# Patient Record
Sex: FEMALE | Race: ASIAN | NOT HISPANIC OR LATINO
[De-identification: names, ages, dates, MRNs, and addresses within clinical notes are randomized per-mention and may not be internally consistent; named-entity substitution may affect disease eponyms.]

---

## 2017-03-13 PROBLEM — Z00.00 ENCOUNTER FOR PREVENTIVE HEALTH EXAMINATION: Status: ACTIVE | Noted: 2017-03-13

## 2017-03-14 ENCOUNTER — APPOINTMENT (OUTPATIENT)
Dept: OBGYN | Facility: CLINIC | Age: 27
End: 2017-03-14

## 2017-03-15 ENCOUNTER — EMERGENCY (EMERGENCY)
Facility: HOSPITAL | Age: 27
LOS: 1 days | Discharge: HOME | End: 2017-03-15
Attending: EMERGENCY MEDICINE | Admitting: EMERGENCY MEDICINE
Payer: MEDICAID

## 2017-03-15 VITALS
TEMPERATURE: 98 F | RESPIRATION RATE: 18 BRPM | HEART RATE: 94 BPM | DIASTOLIC BLOOD PRESSURE: 75 MMHG | OXYGEN SATURATION: 100 % | SYSTOLIC BLOOD PRESSURE: 111 MMHG

## 2017-03-15 VITALS
RESPIRATION RATE: 18 BRPM | OXYGEN SATURATION: 100 % | DIASTOLIC BLOOD PRESSURE: 74 MMHG | SYSTOLIC BLOOD PRESSURE: 111 MMHG | TEMPERATURE: 100 F | HEART RATE: 109 BPM

## 2017-03-15 LAB
ALBUMIN SERPL ELPH-MCNC: 4.4 G/DL — SIGNIFICANT CHANGE UP (ref 3.3–5)
ALP SERPL-CCNC: 55 U/L — SIGNIFICANT CHANGE UP (ref 40–120)
ALT FLD-CCNC: 11 U/L — SIGNIFICANT CHANGE UP (ref 4–33)
APPEARANCE UR: CLEAR — SIGNIFICANT CHANGE UP
AST SERPL-CCNC: 16 U/L — SIGNIFICANT CHANGE UP (ref 4–32)
BACTERIA # UR AUTO: SIGNIFICANT CHANGE UP
BASOPHILS # BLD AUTO: 0.01 K/UL — SIGNIFICANT CHANGE UP (ref 0–0.2)
BASOPHILS NFR BLD AUTO: 0.1 % — SIGNIFICANT CHANGE UP (ref 0–2)
BILIRUB SERPL-MCNC: 0.5 MG/DL — SIGNIFICANT CHANGE UP (ref 0.2–1.2)
BILIRUB UR-MCNC: NEGATIVE — SIGNIFICANT CHANGE UP
BLD GP AB SCN SERPL QL: NEGATIVE — SIGNIFICANT CHANGE UP
BLOOD UR QL VISUAL: HIGH
BUN SERPL-MCNC: 4 MG/DL — LOW (ref 7–23)
CALCIUM SERPL-MCNC: 9.3 MG/DL — SIGNIFICANT CHANGE UP (ref 8.4–10.5)
CHLORIDE SERPL-SCNC: 100 MMOL/L — SIGNIFICANT CHANGE UP (ref 98–107)
CO2 SERPL-SCNC: 21 MMOL/L — LOW (ref 22–31)
COLOR SPEC: SIGNIFICANT CHANGE UP
CREAT SERPL-MCNC: 0.6 MG/DL — SIGNIFICANT CHANGE UP (ref 0.5–1.3)
EOSINOPHIL # BLD AUTO: 0.13 K/UL — SIGNIFICANT CHANGE UP (ref 0–0.5)
EOSINOPHIL NFR BLD AUTO: 1.8 % — SIGNIFICANT CHANGE UP (ref 0–6)
GLUCOSE SERPL-MCNC: 94 MG/DL — SIGNIFICANT CHANGE UP (ref 70–99)
GLUCOSE UR-MCNC: NEGATIVE — SIGNIFICANT CHANGE UP
HCG SERPL-ACNC: SIGNIFICANT CHANGE UP MIU/ML
HCT VFR BLD CALC: 36.9 % — SIGNIFICANT CHANGE UP (ref 34.5–45)
HGB BLD-MCNC: 12.1 G/DL — SIGNIFICANT CHANGE UP (ref 11.5–15.5)
IMM GRANULOCYTES NFR BLD AUTO: 0.1 % — SIGNIFICANT CHANGE UP (ref 0–1.5)
KETONES UR-MCNC: SIGNIFICANT CHANGE UP
LEUKOCYTE ESTERASE UR-ACNC: HIGH
LYMPHOCYTES # BLD AUTO: 1.11 K/UL — SIGNIFICANT CHANGE UP (ref 1–3.3)
LYMPHOCYTES # BLD AUTO: 15.5 % — SIGNIFICANT CHANGE UP (ref 13–44)
MCHC RBC-ENTMCNC: 27 PG — SIGNIFICANT CHANGE UP (ref 27–34)
MCHC RBC-ENTMCNC: 32.8 % — SIGNIFICANT CHANGE UP (ref 32–36)
MCV RBC AUTO: 82.4 FL — SIGNIFICANT CHANGE UP (ref 80–100)
MONOCYTES # BLD AUTO: 0.66 K/UL — SIGNIFICANT CHANGE UP (ref 0–0.9)
MONOCYTES NFR BLD AUTO: 9.2 % — SIGNIFICANT CHANGE UP (ref 2–14)
MUCOUS THREADS # UR AUTO: SIGNIFICANT CHANGE UP
NEUTROPHILS # BLD AUTO: 5.23 K/UL — SIGNIFICANT CHANGE UP (ref 1.8–7.4)
NEUTROPHILS NFR BLD AUTO: 73.3 % — SIGNIFICANT CHANGE UP (ref 43–77)
NITRITE UR-MCNC: NEGATIVE — SIGNIFICANT CHANGE UP
PH UR: 6.5 — SIGNIFICANT CHANGE UP (ref 4.6–8)
PLATELET # BLD AUTO: 163 K/UL — SIGNIFICANT CHANGE UP (ref 150–400)
PMV BLD: 11.8 FL — SIGNIFICANT CHANGE UP (ref 7–13)
POTASSIUM SERPL-MCNC: 3.5 MMOL/L — SIGNIFICANT CHANGE UP (ref 3.5–5.3)
POTASSIUM SERPL-SCNC: 3.5 MMOL/L — SIGNIFICANT CHANGE UP (ref 3.5–5.3)
PROT SERPL-MCNC: 7.3 G/DL — SIGNIFICANT CHANGE UP (ref 6–8.3)
PROT UR-MCNC: NEGATIVE — SIGNIFICANT CHANGE UP
RBC # BLD: 4.48 M/UL — SIGNIFICANT CHANGE UP (ref 3.8–5.2)
RBC # FLD: 14.1 % — SIGNIFICANT CHANGE UP (ref 10.3–14.5)
RBC CASTS # UR COMP ASSIST: HIGH (ref 0–?)
RH IG SCN BLD-IMP: POSITIVE — SIGNIFICANT CHANGE UP
SODIUM SERPL-SCNC: 137 MMOL/L — SIGNIFICANT CHANGE UP (ref 135–145)
SP GR SPEC: 1.01 — SIGNIFICANT CHANGE UP (ref 1–1.03)
SQUAMOUS # UR AUTO: SIGNIFICANT CHANGE UP
UROBILINOGEN FLD QL: NORMAL E.U. — SIGNIFICANT CHANGE UP (ref 0.1–0.2)
WBC # BLD: 7.15 K/UL — SIGNIFICANT CHANGE UP (ref 3.8–10.5)
WBC # FLD AUTO: 7.15 K/UL — SIGNIFICANT CHANGE UP (ref 3.8–10.5)
WBC UR QL: SIGNIFICANT CHANGE UP (ref 0–?)

## 2017-03-15 PROCEDURE — 76801 OB US < 14 WKS SINGLE FETUS: CPT | Mod: 26

## 2017-03-15 PROCEDURE — 99284 EMERGENCY DEPT VISIT MOD MDM: CPT

## 2017-03-15 RX ORDER — CEPHALEXIN 500 MG
1 CAPSULE ORAL
Qty: 20 | Refills: 0 | OUTPATIENT
Start: 2017-03-15 | End: 2017-03-25

## 2017-03-15 RX ORDER — CEPHALEXIN 500 MG
500 CAPSULE ORAL EVERY 12 HOURS
Qty: 0 | Refills: 0 | Status: DISCONTINUED | OUTPATIENT
Start: 2017-03-15 | End: 2017-03-19

## 2017-03-15 RX ORDER — SODIUM CHLORIDE 9 MG/ML
2000 INJECTION INTRAMUSCULAR; INTRAVENOUS; SUBCUTANEOUS ONCE
Qty: 0 | Refills: 0 | Status: COMPLETED | OUTPATIENT
Start: 2017-03-15 | End: 2017-03-15

## 2017-03-15 RX ADMIN — SODIUM CHLORIDE 1000 MILLILITER(S): 9 INJECTION INTRAMUSCULAR; INTRAVENOUS; SUBCUTANEOUS at 20:53

## 2017-03-15 RX ADMIN — Medication 500 MILLIGRAM(S): at 22:47

## 2017-03-15 NOTE — ED ADULT TRIAGE NOTE - CHIEF COMPLAINT QUOTE
states" I am having back pain since 2 days , nausea fever with head ache, patient is about 10 weeks pregnant and did not see any OB/GYN did not have a sonogram done yet. denies cp, dizziness.

## 2017-03-15 NOTE — ED ADULT NURSE NOTE - OBJECTIVE STATEMENT
received pt to intake room 3 for evaluation of general malaise, fevers and malaise since last night. pt reports being 10 weeks pregnant. denies any pregnancy related complaints. pt presents awake a&ox4, denies dizziness. skin warm, dry, intact, appropriate for race. respirations even unlabored. lungs cta. denies cp or sob. abdomen soft nontender nondistended bs+x4 denies n/v/d. ivl placed bloods drawn and sent. ns0.9% bolus infusing. family at bedside.

## 2017-03-15 NOTE — ED PROVIDER NOTE - MEDICAL DECISION MAKING DETAILS
26 YOF G1T0 10 weeks pregnant LMP first week of Jan 17 p/w muscle aches, fever, and headache. Sx began early yesterday. Tachycardia.  Evaluate metabolic and hematologic function given no formal OB care.  Treat symptomatically, resuscitate, reassess.

## 2017-03-15 NOTE — ED PROVIDER NOTE - OBJECTIVE STATEMENT
26 YOF G1T0 10 weeks pregnant LMP first week of Jan 17 p/w muscle aches, fever, and headache. Sx began early yesterday.  Associated malaise. Pt could not sleep last night and decided to come to the ED.  No urinary urgency, urinary retention, urinary frequency, hematuria, dysuria, dark urine, flank pain, groin pain, or urethral pain. No cough, no chest pain, no SOB.

## 2017-03-15 NOTE — ED ADULT NURSE REASSESSMENT NOTE - NS ED NURSE REASSESS COMMENT FT1
Patient discharged by MD. No signs of distress at this time. Discharge instructions were provided. Imelda GREGORY

## 2017-03-15 NOTE — ED PROVIDER NOTE - PROGRESS NOTE DETAILS
JW Symptoms improved. US unremarkable. Pt instructed to continue taking Tylenol.  TAUS reveals normal IUP consistent with dates.  Pt suffers from viral syndrome.  I reviewed the alarm symptoms of this patient's diagnosis and discussed criteria for their return to the emergency department.  I instructed the patient to return to the emergency department with any alarm symptoms for their specific diagnosis including headache, fever, confusion, vaginal bleeding, abdominal pain, any worsening symptoms, and any other concerns.  I instructed this patient to call their primary doctor today, to inform them of their visit to the emergency department, and to obtain a repeat evaluation in the next 24 hours.  This patient understood and agreed with our plan for follow up and felt safe returning home.  At the time of discharge this patient remained in stable condition, in no acute distress, with stable vital signs. JW Symptoms improved. US unremarkable.  Given bacteruria pt given and prescribed Keflex. Pt instructed to continue taking Tylenol.  TAUS reveals normal IUP consistent with dates.  Pt suffers from viral syndrome.  I reviewed the alarm symptoms of this patient's diagnosis and discussed criteria for their return to the emergency department.  I instructed the patient to return to the emergency department with any alarm symptoms for their specific diagnosis including headache, fever, confusion, vaginal bleeding, abdominal pain, any worsening symptoms, and any other concerns.  I instructed this patient to call their primary doctor today, to inform them of their visit to the emergency department, and to obtain a repeat evaluation in the next 24 hours.  This patient understood and agreed with our plan for follow up and felt safe returning home.  At the time of discharge this patient remained in stable condition, in no acute distress, with stable vital signs.

## 2017-03-15 NOTE — ED PROVIDER NOTE - ATTENDING CONTRIBUTION TO CARE
DR. SMALL, ATTENDING MD-  I performed a face to face bedside interview with patient regarding history of present illness, review of symptoms and past medical history. I completed an independent physical exam.  I have discussed patient's plan of care with the resident.   Documentation as above in the note.    27 y/o female 10 wk preg here with c/o muscle aches in back, ha, nausea, sneezing, runny nose.  Denies f/c, neck stiffness, cp, sob, cough, abd pain, n/v/d, dysuria, vag bleed or d/c, rash.  Afebrile, vs wnl, nad, neck supple, ctabil, s1s2 rrr no m/r/g, abd soft non ttp no r/g, no cva tenderness b/l, no leg swelling b/l, no rash.  Likely viral syndrome.  Obtain cbc, bmp, ua, give ivf, tylenol, reassess.

## 2017-03-21 ENCOUNTER — APPOINTMENT (OUTPATIENT)
Dept: OBGYN | Facility: CLINIC | Age: 27
End: 2017-03-21

## 2017-05-04 PROBLEM — Z00.00 ENCOUNTER FOR PREVENTIVE HEALTH EXAMINATION: Noted: 2017-05-04

## 2017-05-17 ENCOUNTER — APPOINTMENT (OUTPATIENT)
Dept: OBGYN | Facility: CLINIC | Age: 27
End: 2017-05-17

## 2017-06-01 ENCOUNTER — ASOB RESULT (OUTPATIENT)
Age: 27
End: 2017-06-01

## 2017-06-01 ENCOUNTER — APPOINTMENT (OUTPATIENT)
Dept: ANTEPARTUM | Facility: CLINIC | Age: 27
End: 2017-06-01

## 2017-09-18 ENCOUNTER — OUTPATIENT (OUTPATIENT)
Dept: OUTPATIENT SERVICES | Facility: HOSPITAL | Age: 27
LOS: 1 days | End: 2017-09-18

## 2017-09-18 ENCOUNTER — APPOINTMENT (OUTPATIENT)
Dept: ANTEPARTUM | Facility: HOSPITAL | Age: 27
End: 2017-09-18
Payer: MEDICAID

## 2017-09-18 ENCOUNTER — APPOINTMENT (OUTPATIENT)
Dept: ANTEPARTUM | Facility: CLINIC | Age: 27
End: 2017-09-18
Payer: MEDICAID

## 2017-09-18 ENCOUNTER — ASOB RESULT (OUTPATIENT)
Age: 27
End: 2017-09-18

## 2017-09-18 PROCEDURE — 76816 OB US FOLLOW-UP PER FETUS: CPT

## 2017-09-18 PROCEDURE — 76818 FETAL BIOPHYS PROFILE W/NST: CPT | Mod: 26,59

## 2017-09-18 PROCEDURE — 59412 ANTEPARTUM MANIPULATION: CPT

## 2017-09-18 PROCEDURE — 59025 FETAL NON-STRESS TEST: CPT | Mod: 26

## 2017-09-19 ENCOUNTER — APPOINTMENT (OUTPATIENT)
Dept: ANTEPARTUM | Facility: HOSPITAL | Age: 27
End: 2017-09-19
Payer: MEDICAID

## 2017-09-19 ENCOUNTER — ASOB RESULT (OUTPATIENT)
Age: 27
End: 2017-09-19

## 2017-09-19 ENCOUNTER — OUTPATIENT (OUTPATIENT)
Dept: OUTPATIENT SERVICES | Facility: HOSPITAL | Age: 27
LOS: 1 days | End: 2017-09-19

## 2017-09-19 PROCEDURE — 59025 FETAL NON-STRESS TEST: CPT | Mod: 26

## 2017-09-29 DIAGNOSIS — Z3A.37 37 WEEKS GESTATION OF PREGNANCY: ICD-10-CM

## 2017-10-04 ENCOUNTER — OUTPATIENT (OUTPATIENT)
Dept: OUTPATIENT SERVICES | Facility: HOSPITAL | Age: 27
LOS: 1 days | End: 2017-10-04

## 2017-10-04 DIAGNOSIS — O26.899 OTHER SPECIFIED PREGNANCY RELATED CONDITIONS, UNSPECIFIED TRIMESTER: ICD-10-CM

## 2017-10-04 DIAGNOSIS — Z3A.00 WEEKS OF GESTATION OF PREGNANCY NOT SPECIFIED: ICD-10-CM

## 2017-10-06 ENCOUNTER — INPATIENT (INPATIENT)
Facility: HOSPITAL | Age: 27
LOS: 2 days | Discharge: HOME | End: 2017-10-09
Attending: OBSTETRICS & GYNECOLOGY | Admitting: OBSTETRICS & GYNECOLOGY

## 2017-10-06 ENCOUNTER — OUTPATIENT (OUTPATIENT)
Dept: OUTPATIENT SERVICES | Facility: HOSPITAL | Age: 27
LOS: 1 days | End: 2017-10-06

## 2017-10-06 VITALS — WEIGHT: 158.73 LBS | HEIGHT: 62 IN

## 2017-10-06 DIAGNOSIS — O26.899 OTHER SPECIFIED PREGNANCY RELATED CONDITIONS, UNSPECIFIED TRIMESTER: ICD-10-CM

## 2017-10-06 DIAGNOSIS — Z3A.00 WEEKS OF GESTATION OF PREGNANCY NOT SPECIFIED: ICD-10-CM

## 2017-10-06 LAB
BASOPHILS # BLD AUTO: 0.03 K/UL — SIGNIFICANT CHANGE UP (ref 0–0.2)
BASOPHILS NFR BLD AUTO: 0.2 % — SIGNIFICANT CHANGE UP (ref 0–2)
BLD GP AB SCN SERPL QL: NEGATIVE — SIGNIFICANT CHANGE UP
EOSINOPHIL # BLD AUTO: 0.05 K/UL — SIGNIFICANT CHANGE UP (ref 0–0.5)
EOSINOPHIL NFR BLD AUTO: 0.3 % — SIGNIFICANT CHANGE UP (ref 0–6)
HCT VFR BLD CALC: 40.4 % — SIGNIFICANT CHANGE UP (ref 34.5–45)
HGB BLD-MCNC: 13.2 G/DL — SIGNIFICANT CHANGE UP (ref 11.5–15.5)
IMM GRANULOCYTES # BLD AUTO: 0.17 # — SIGNIFICANT CHANGE UP
IMM GRANULOCYTES NFR BLD AUTO: 1.2 % — SIGNIFICANT CHANGE UP (ref 0–1.5)
LYMPHOCYTES # BLD AUTO: 1.98 K/UL — SIGNIFICANT CHANGE UP (ref 1–3.3)
LYMPHOCYTES # BLD AUTO: 13.6 % — SIGNIFICANT CHANGE UP (ref 13–44)
MCHC RBC-ENTMCNC: 30 PG — SIGNIFICANT CHANGE UP (ref 27–34)
MCHC RBC-ENTMCNC: 32.7 % — SIGNIFICANT CHANGE UP (ref 32–36)
MCV RBC AUTO: 91.8 FL — SIGNIFICANT CHANGE UP (ref 80–100)
MONOCYTES # BLD AUTO: 0.82 K/UL — SIGNIFICANT CHANGE UP (ref 0–0.9)
MONOCYTES NFR BLD AUTO: 5.6 % — SIGNIFICANT CHANGE UP (ref 2–14)
NEUTROPHILS # BLD AUTO: 11.53 K/UL — HIGH (ref 1.8–7.4)
NEUTROPHILS NFR BLD AUTO: 79.1 % — HIGH (ref 43–77)
NRBC # FLD: 0 — SIGNIFICANT CHANGE UP
PLATELET # BLD AUTO: 136 K/UL — LOW (ref 150–400)
PMV BLD: 12.4 FL — SIGNIFICANT CHANGE UP (ref 7–13)
RBC # BLD: 4.4 M/UL — SIGNIFICANT CHANGE UP (ref 3.8–5.2)
RBC # FLD: 14.2 % — SIGNIFICANT CHANGE UP (ref 10.3–14.5)
RH IG SCN BLD-IMP: POSITIVE — SIGNIFICANT CHANGE UP
WBC # BLD: 14.58 K/UL — HIGH (ref 3.8–10.5)
WBC # FLD AUTO: 14.58 K/UL — HIGH (ref 3.8–10.5)

## 2017-10-06 RX ORDER — SODIUM CHLORIDE 9 MG/ML
1000 INJECTION, SOLUTION INTRAVENOUS ONCE
Qty: 0 | Refills: 0 | Status: COMPLETED | OUTPATIENT
Start: 2017-10-06 | End: 2017-10-06

## 2017-10-06 RX ORDER — OXYTOCIN 10 UNIT/ML
333.33 VIAL (ML) INJECTION
Qty: 20 | Refills: 0 | Status: DISCONTINUED | OUTPATIENT
Start: 2017-10-06 | End: 2017-10-07

## 2017-10-06 RX ORDER — SODIUM CHLORIDE 9 MG/ML
1000 INJECTION, SOLUTION INTRAVENOUS ONCE
Qty: 0 | Refills: 0 | Status: DISCONTINUED | OUTPATIENT
Start: 2017-10-06 | End: 2017-10-07

## 2017-10-06 RX ORDER — BUTORPHANOL TARTRATE 2 MG/ML
2 INJECTION, SOLUTION INTRAMUSCULAR; INTRAVENOUS ONCE
Qty: 0 | Refills: 0 | Status: DISCONTINUED | OUTPATIENT
Start: 2017-10-06 | End: 2017-10-06

## 2017-10-06 RX ORDER — SODIUM CHLORIDE 9 MG/ML
1000 INJECTION, SOLUTION INTRAVENOUS
Qty: 0 | Refills: 0 | Status: DISCONTINUED | OUTPATIENT
Start: 2017-10-06 | End: 2017-10-07

## 2017-10-06 RX ORDER — CITRIC ACID/SODIUM CITRATE 300-500 MG
15 SOLUTION, ORAL ORAL EVERY 4 HOURS
Qty: 0 | Refills: 0 | Status: DISCONTINUED | OUTPATIENT
Start: 2017-10-06 | End: 2017-10-07

## 2017-10-06 RX ORDER — OXYTOCIN 10 UNIT/ML
333.33 VIAL (ML) INJECTION
Qty: 20 | Refills: 0 | Status: COMPLETED | OUTPATIENT
Start: 2017-10-06

## 2017-10-06 RX ORDER — INFLUENZA VIRUS VACCINE 15; 15; 15; 15 UG/.5ML; UG/.5ML; UG/.5ML; UG/.5ML
0.5 SUSPENSION INTRAMUSCULAR ONCE
Qty: 0 | Refills: 0 | Status: DISCONTINUED | OUTPATIENT
Start: 2017-10-06 | End: 2017-10-09

## 2017-10-06 RX ORDER — CITRIC ACID/SODIUM CITRATE 300-500 MG
15 SOLUTION, ORAL ORAL EVERY 4 HOURS
Qty: 0 | Refills: 0 | Status: DISCONTINUED | OUTPATIENT
Start: 2017-10-06 | End: 2017-10-06

## 2017-10-06 RX ADMIN — SODIUM CHLORIDE 2000 MILLILITER(S): 9 INJECTION, SOLUTION INTRAVENOUS at 21:55

## 2017-10-06 RX ADMIN — BUTORPHANOL TARTRATE 2 MILLIGRAM(S): 2 INJECTION, SOLUTION INTRAMUSCULAR; INTRAVENOUS at 19:35

## 2017-10-06 RX ADMIN — SODIUM CHLORIDE 150 MILLILITER(S): 9 INJECTION, SOLUTION INTRAVENOUS at 18:11

## 2017-10-06 RX ADMIN — Medication 204 MILLIGRAM(S): at 18:18

## 2017-10-06 RX ADMIN — SODIUM CHLORIDE 125 MILLILITER(S): 9 INJECTION, SOLUTION INTRAVENOUS at 19:36

## 2017-10-06 RX ADMIN — BUTORPHANOL TARTRATE 2 MILLIGRAM(S): 2 INJECTION, SOLUTION INTRAMUSCULAR; INTRAVENOUS at 18:08

## 2017-10-07 ENCOUNTER — TRANSCRIPTION ENCOUNTER (OUTPATIENT)
Age: 27
End: 2017-10-07

## 2017-10-07 LAB — T PALLIDUM AB TITR SER: NEGATIVE — SIGNIFICANT CHANGE UP

## 2017-10-07 RX ORDER — AER TRAVELER 0.5 G/1
1 SOLUTION RECTAL; TOPICAL EVERY 4 HOURS
Qty: 0 | Refills: 0 | Status: DISCONTINUED | OUTPATIENT
Start: 2017-10-07 | End: 2017-10-09

## 2017-10-07 RX ORDER — GENTAMICIN SULFATE 40 MG/ML
80 VIAL (ML) INJECTION ONCE
Qty: 0 | Refills: 0 | Status: COMPLETED | OUTPATIENT
Start: 2017-10-07 | End: 2017-10-07

## 2017-10-07 RX ORDER — OXYTOCIN 10 UNIT/ML
10 VIAL (ML) INJECTION ONCE
Qty: 0 | Refills: 0 | Status: COMPLETED | OUTPATIENT
Start: 2017-10-07 | End: 2017-10-07

## 2017-10-07 RX ORDER — GLYCERIN ADULT
1 SUPPOSITORY, RECTAL RECTAL AT BEDTIME
Qty: 0 | Refills: 0 | Status: DISCONTINUED | OUTPATIENT
Start: 2017-10-07 | End: 2017-10-09

## 2017-10-07 RX ORDER — ACETAMINOPHEN 500 MG
975 TABLET ORAL EVERY 6 HOURS
Qty: 0 | Refills: 0 | Status: DISCONTINUED | OUTPATIENT
Start: 2017-10-07 | End: 2017-10-08

## 2017-10-07 RX ORDER — ACETAMINOPHEN 500 MG
975 TABLET ORAL EVERY 6 HOURS
Qty: 0 | Refills: 0 | Status: DISCONTINUED | OUTPATIENT
Start: 2017-10-07 | End: 2017-10-09

## 2017-10-07 RX ORDER — SODIUM CHLORIDE 9 MG/ML
1000 INJECTION, SOLUTION INTRAVENOUS ONCE
Qty: 0 | Refills: 0 | Status: DISCONTINUED | OUTPATIENT
Start: 2017-10-07 | End: 2017-10-07

## 2017-10-07 RX ORDER — PRAMOXINE HYDROCHLORIDE 150 MG/15G
1 AEROSOL, FOAM RECTAL EVERY 4 HOURS
Qty: 0 | Refills: 0 | Status: DISCONTINUED | OUTPATIENT
Start: 2017-10-07 | End: 2017-10-08

## 2017-10-07 RX ORDER — OXYTOCIN 10 UNIT/ML
2 VIAL (ML) INJECTION
Qty: 30 | Refills: 0 | Status: DISCONTINUED | OUTPATIENT
Start: 2017-10-07 | End: 2017-10-08

## 2017-10-07 RX ORDER — GENTAMICIN SULFATE 40 MG/ML
VIAL (ML) INJECTION
Qty: 0 | Refills: 0 | Status: DISCONTINUED | OUTPATIENT
Start: 2017-10-07 | End: 2017-10-07

## 2017-10-07 RX ORDER — MAGNESIUM HYDROXIDE 400 MG/1
30 TABLET, CHEWABLE ORAL
Qty: 0 | Refills: 0 | Status: DISCONTINUED | OUTPATIENT
Start: 2017-10-07 | End: 2017-10-09

## 2017-10-07 RX ORDER — DOCUSATE SODIUM 100 MG
100 CAPSULE ORAL
Qty: 0 | Refills: 0 | Status: DISCONTINUED | OUTPATIENT
Start: 2017-10-07 | End: 2017-10-09

## 2017-10-07 RX ORDER — AMPICILLIN TRIHYDRATE 250 MG
2 CAPSULE ORAL ONCE
Qty: 0 | Refills: 0 | Status: COMPLETED | OUTPATIENT
Start: 2017-10-07 | End: 2017-10-07

## 2017-10-07 RX ORDER — AMPICILLIN TRIHYDRATE 250 MG
2 CAPSULE ORAL EVERY 6 HOURS
Qty: 0 | Refills: 0 | Status: DISCONTINUED | OUTPATIENT
Start: 2017-10-07 | End: 2017-10-07

## 2017-10-07 RX ORDER — HYDROCORTISONE 1 %
1 OINTMENT (GRAM) TOPICAL EVERY 4 HOURS
Qty: 0 | Refills: 0 | Status: DISCONTINUED | OUTPATIENT
Start: 2017-10-07 | End: 2017-10-08

## 2017-10-07 RX ORDER — GENTAMICIN SULFATE 40 MG/ML
80 VIAL (ML) INJECTION EVERY 8 HOURS
Qty: 0 | Refills: 0 | Status: DISCONTINUED | OUTPATIENT
Start: 2017-10-07 | End: 2017-10-07

## 2017-10-07 RX ORDER — OXYTOCIN 10 UNIT/ML
333.33 VIAL (ML) INJECTION
Qty: 20 | Refills: 0 | Status: DISCONTINUED | OUTPATIENT
Start: 2017-10-07 | End: 2017-10-07

## 2017-10-07 RX ORDER — IBUPROFEN 200 MG
600 TABLET ORAL EVERY 6 HOURS
Qty: 0 | Refills: 0 | Status: DISCONTINUED | OUTPATIENT
Start: 2017-10-07 | End: 2017-10-09

## 2017-10-07 RX ORDER — LANOLIN
1 OINTMENT (GRAM) TOPICAL EVERY 6 HOURS
Qty: 0 | Refills: 0 | Status: DISCONTINUED | OUTPATIENT
Start: 2017-10-07 | End: 2017-10-09

## 2017-10-07 RX ORDER — DIPHENHYDRAMINE HCL 50 MG
25 CAPSULE ORAL EVERY 6 HOURS
Qty: 0 | Refills: 0 | Status: DISCONTINUED | OUTPATIENT
Start: 2017-10-07 | End: 2017-10-09

## 2017-10-07 RX ORDER — CITRIC ACID/SODIUM CITRATE 300-500 MG
15 SOLUTION, ORAL ORAL EVERY 4 HOURS
Qty: 0 | Refills: 0 | Status: DISCONTINUED | OUTPATIENT
Start: 2017-10-07 | End: 2017-10-07

## 2017-10-07 RX ORDER — IBUPROFEN 200 MG
600 TABLET ORAL EVERY 6 HOURS
Qty: 0 | Refills: 0 | Status: COMPLETED | OUTPATIENT
Start: 2017-10-07 | End: 2018-09-05

## 2017-10-07 RX ORDER — DIBUCAINE 1 %
1 OINTMENT (GRAM) RECTAL EVERY 4 HOURS
Qty: 0 | Refills: 0 | Status: DISCONTINUED | OUTPATIENT
Start: 2017-10-07 | End: 2017-10-09

## 2017-10-07 RX ORDER — ACETAMINOPHEN 500 MG
975 TABLET ORAL EVERY 6 HOURS
Qty: 0 | Refills: 0 | Status: COMPLETED | OUTPATIENT
Start: 2017-10-07 | End: 2018-09-05

## 2017-10-07 RX ORDER — IBUPROFEN 200 MG
600 TABLET ORAL EVERY 6 HOURS
Qty: 0 | Refills: 0 | Status: DISCONTINUED | OUTPATIENT
Start: 2017-10-07 | End: 2017-10-08

## 2017-10-07 RX ORDER — OXYTOCIN 10 UNIT/ML
333.33 VIAL (ML) INJECTION
Qty: 20 | Refills: 0 | Status: DISCONTINUED | OUTPATIENT
Start: 2017-10-07 | End: 2017-10-08

## 2017-10-07 RX ORDER — TETANUS TOXOID, REDUCED DIPHTHERIA TOXOID AND ACELLULAR PERTUSSIS VACCINE, ADSORBED 5; 2.5; 8; 8; 2.5 [IU]/.5ML; [IU]/.5ML; UG/.5ML; UG/.5ML; UG/.5ML
0.5 SUSPENSION INTRAMUSCULAR ONCE
Qty: 0 | Refills: 0 | Status: COMPLETED | OUTPATIENT
Start: 2017-10-07 | End: 2018-09-05

## 2017-10-07 RX ORDER — SIMETHICONE 80 MG/1
80 TABLET, CHEWABLE ORAL EVERY 6 HOURS
Qty: 0 | Refills: 0 | Status: DISCONTINUED | OUTPATIENT
Start: 2017-10-07 | End: 2017-10-09

## 2017-10-07 RX ORDER — OXYCODONE HYDROCHLORIDE 5 MG/1
5 TABLET ORAL EVERY 4 HOURS
Qty: 0 | Refills: 0 | Status: DISCONTINUED | OUTPATIENT
Start: 2017-10-07 | End: 2017-10-09

## 2017-10-07 RX ORDER — KETOROLAC TROMETHAMINE 30 MG/ML
30 SYRINGE (ML) INJECTION ONCE
Qty: 0 | Refills: 0 | Status: DISCONTINUED | OUTPATIENT
Start: 2017-10-07 | End: 2017-10-07

## 2017-10-07 RX ORDER — SODIUM CHLORIDE 9 MG/ML
3 INJECTION INTRAMUSCULAR; INTRAVENOUS; SUBCUTANEOUS EVERY 8 HOURS
Qty: 0 | Refills: 0 | Status: DISCONTINUED | OUTPATIENT
Start: 2017-10-07 | End: 2017-10-08

## 2017-10-07 RX ORDER — AMPICILLIN TRIHYDRATE 250 MG
CAPSULE ORAL
Qty: 0 | Refills: 0 | Status: DISCONTINUED | OUTPATIENT
Start: 2017-10-07 | End: 2017-10-07

## 2017-10-07 RX ORDER — OXYCODONE HYDROCHLORIDE 5 MG/1
5 TABLET ORAL
Qty: 0 | Refills: 0 | Status: DISCONTINUED | OUTPATIENT
Start: 2017-10-07 | End: 2017-10-09

## 2017-10-07 RX ADMIN — Medication 975 MILLIGRAM(S): at 19:40

## 2017-10-07 RX ADMIN — Medication 0.25 MILLIGRAM(S): at 02:30

## 2017-10-07 RX ADMIN — Medication 0.25 MILLIGRAM(S): at 02:29

## 2017-10-07 RX ADMIN — SODIUM CHLORIDE 150 MILLILITER(S): 9 INJECTION, SOLUTION INTRAVENOUS at 13:43

## 2017-10-07 RX ADMIN — Medication 975 MILLIGRAM(S): at 13:07

## 2017-10-07 RX ADMIN — Medication 1 TABLET(S): at 18:43

## 2017-10-07 RX ADMIN — Medication 2 MILLIUNIT(S)/MIN: at 02:23

## 2017-10-07 RX ADMIN — Medication 600 MILLIGRAM(S): at 19:40

## 2017-10-07 RX ADMIN — Medication 600 MILLIGRAM(S): at 18:44

## 2017-10-07 RX ADMIN — OXYCODONE HYDROCHLORIDE 5 MILLIGRAM(S): 5 TABLET ORAL at 15:20

## 2017-10-07 RX ADMIN — OXYCODONE HYDROCHLORIDE 5 MILLIGRAM(S): 5 TABLET ORAL at 16:13

## 2017-10-07 RX ADMIN — Medication 216 GRAM(S): at 13:11

## 2017-10-07 RX ADMIN — SODIUM CHLORIDE 3 MILLILITER(S): 9 INJECTION INTRAMUSCULAR; INTRAVENOUS; SUBCUTANEOUS at 22:15

## 2017-10-07 RX ADMIN — Medication 200 MILLIGRAM(S): at 13:09

## 2017-10-07 RX ADMIN — SODIUM CHLORIDE 150 MILLILITER(S): 9 INJECTION, SOLUTION INTRAVENOUS at 07:39

## 2017-10-07 RX ADMIN — Medication 10 UNIT(S): at 13:51

## 2017-10-07 RX ADMIN — Medication 975 MILLIGRAM(S): at 18:43

## 2017-10-07 NOTE — DISCHARGE NOTE OB - MEDICATION SUMMARY - MEDICATIONS TO STOP TAKING
I will STOP taking the medications listed below when I get home from the hospital:    cephalexin 500 mg oral capsule  -- 1 cap(s) by mouth 2 times a day  -- Finish all this medication unless otherwise directed by prescriber.

## 2017-10-07 NOTE — DISCHARGE NOTE OB - MEDICATION SUMMARY - MEDICATIONS TO TAKE
I will START or STAY ON the medications listed below when I get home from the hospital:    acetaminophen 325 mg oral tablet  -- 3 tab(s) by mouth every 6 hours  -- Indication: For pain    ibuprofen 600 mg oral tablet  -- 1 tab(s) by mouth every 6 hours  -- Indication: For pain    docusate sodium 100 mg oral capsule  -- 1 cap(s) by mouth 2 times a day, As needed, Stool Softening  -- Indication: For stool softner

## 2017-10-07 NOTE — DISCHARGE NOTE OB - FINDINGS/TREATMENT
delivery of live born male infant    maternal fever treated intra partum with antibiotice   baby went to the NICU

## 2017-10-07 NOTE — DISCHARGE NOTE OB - CARE PLAN
Principal Discharge DX:	Normal delivery at term  Goal:	self care  Instructions for follow-up, activity and diet:	healthy dier  Secondary Diagnosis:	Maternal fever during labor  Goal:	afebrile  Instructions for follow-up, activity and diet:	antibiotics in the hospital until no temperature for 24 hours

## 2017-10-08 RX ORDER — PRAMOXINE HYDROCHLORIDE 150 MG/15G
1 AEROSOL, FOAM RECTAL EVERY 4 HOURS
Qty: 0 | Refills: 0 | Status: DISCONTINUED | OUTPATIENT
Start: 2017-10-08 | End: 2017-10-09

## 2017-10-08 RX ORDER — IBUPROFEN 200 MG
1 TABLET ORAL
Qty: 0 | Refills: 0 | DISCHARGE
Start: 2017-10-08

## 2017-10-08 RX ORDER — ACETAMINOPHEN 500 MG
3 TABLET ORAL
Qty: 0 | Refills: 0 | DISCHARGE
Start: 2017-10-08

## 2017-10-08 RX ORDER — DOCUSATE SODIUM 100 MG
1 CAPSULE ORAL
Qty: 0 | Refills: 0 | DISCHARGE
Start: 2017-10-08

## 2017-10-08 RX ORDER — TETANUS TOXOID, REDUCED DIPHTHERIA TOXOID AND ACELLULAR PERTUSSIS VACCINE, ADSORBED 5; 2.5; 8; 8; 2.5 [IU]/.5ML; [IU]/.5ML; UG/.5ML; UG/.5ML; UG/.5ML
0.5 SUSPENSION INTRAMUSCULAR ONCE
Qty: 0 | Refills: 0 | Status: COMPLETED | OUTPATIENT
Start: 2017-10-08 | End: 2017-10-08

## 2017-10-08 RX ORDER — HYDROCORTISONE 1 %
1 OINTMENT (GRAM) TOPICAL EVERY 4 HOURS
Qty: 0 | Refills: 0 | Status: DISCONTINUED | OUTPATIENT
Start: 2017-10-08 | End: 2017-10-09

## 2017-10-08 RX ADMIN — Medication 600 MILLIGRAM(S): at 20:43

## 2017-10-08 RX ADMIN — TETANUS TOXOID, REDUCED DIPHTHERIA TOXOID AND ACELLULAR PERTUSSIS VACCINE, ADSORBED 0.5 MILLILITER(S): 5; 2.5; 8; 8; 2.5 SUSPENSION INTRAMUSCULAR at 14:47

## 2017-10-08 RX ADMIN — Medication 600 MILLIGRAM(S): at 21:13

## 2017-10-08 RX ADMIN — Medication 975 MILLIGRAM(S): at 20:43

## 2017-10-08 RX ADMIN — Medication 600 MILLIGRAM(S): at 14:42

## 2017-10-08 RX ADMIN — Medication 975 MILLIGRAM(S): at 01:11

## 2017-10-08 RX ADMIN — Medication 100 MILLIGRAM(S): at 14:49

## 2017-10-08 RX ADMIN — Medication 975 MILLIGRAM(S): at 00:41

## 2017-10-08 RX ADMIN — Medication 975 MILLIGRAM(S): at 21:13

## 2017-10-08 RX ADMIN — Medication 600 MILLIGRAM(S): at 01:11

## 2017-10-08 RX ADMIN — Medication 600 MILLIGRAM(S): at 07:10

## 2017-10-08 RX ADMIN — Medication 975 MILLIGRAM(S): at 14:42

## 2017-10-08 RX ADMIN — Medication 600 MILLIGRAM(S): at 06:40

## 2017-10-08 RX ADMIN — Medication 1 TABLET(S): at 14:42

## 2017-10-08 RX ADMIN — Medication 600 MILLIGRAM(S): at 00:41

## 2017-10-09 VITALS
OXYGEN SATURATION: 100 % | DIASTOLIC BLOOD PRESSURE: 81 MMHG | HEART RATE: 69 BPM | TEMPERATURE: 99 F | RESPIRATION RATE: 18 BRPM | SYSTOLIC BLOOD PRESSURE: 125 MMHG

## 2017-10-09 LAB
BASOPHILS # BLD AUTO: 0.02 K/UL — SIGNIFICANT CHANGE UP (ref 0–0.2)
BASOPHILS # BLD AUTO: 0.04 K/UL — SIGNIFICANT CHANGE UP (ref 0–0.2)
BASOPHILS NFR BLD AUTO: 0.2 % — SIGNIFICANT CHANGE UP (ref 0–2)
BASOPHILS NFR BLD AUTO: 0.3 % — SIGNIFICANT CHANGE UP (ref 0–2)
BASOPHILS NFR SPEC: 0 % — SIGNIFICANT CHANGE UP (ref 0–2)
EOSINOPHIL # BLD AUTO: 0.16 K/UL — SIGNIFICANT CHANGE UP (ref 0–0.5)
EOSINOPHIL # BLD AUTO: 0.18 K/UL — SIGNIFICANT CHANGE UP (ref 0–0.5)
EOSINOPHIL NFR BLD AUTO: 1.5 % — SIGNIFICANT CHANGE UP (ref 0–6)
EOSINOPHIL NFR BLD AUTO: 1.5 % — SIGNIFICANT CHANGE UP (ref 0–6)
EOSINOPHIL NFR FLD: 1 % — SIGNIFICANT CHANGE UP (ref 0–6)
HCT VFR BLD CALC: 26.8 % — LOW (ref 34.5–45)
HCT VFR BLD CALC: 28.1 % — LOW (ref 34.5–45)
HGB BLD-MCNC: 8.9 G/DL — LOW (ref 11.5–15.5)
HGB BLD-MCNC: 9.2 G/DL — LOW (ref 11.5–15.5)
IMM GRANULOCYTES # BLD AUTO: 0.26 # — SIGNIFICANT CHANGE UP
IMM GRANULOCYTES # BLD AUTO: 0.28 # — SIGNIFICANT CHANGE UP
IMM GRANULOCYTES NFR BLD AUTO: 2.3 % — HIGH (ref 0–1.5)
IMM GRANULOCYTES NFR BLD AUTO: 2.4 % — HIGH (ref 0–1.5)
LYMPHOCYTES # BLD AUTO: 1.52 K/UL — SIGNIFICANT CHANGE UP (ref 1–3.3)
LYMPHOCYTES # BLD AUTO: 1.97 K/UL — SIGNIFICANT CHANGE UP (ref 1–3.3)
LYMPHOCYTES # BLD AUTO: 14 % — SIGNIFICANT CHANGE UP (ref 13–44)
LYMPHOCYTES # BLD AUTO: 16.4 % — SIGNIFICANT CHANGE UP (ref 13–44)
LYMPHOCYTES NFR SPEC AUTO: 11 % — LOW (ref 13–44)
MANUAL SMEAR VERIFICATION: SIGNIFICANT CHANGE UP
MCHC RBC-ENTMCNC: 30.5 PG — SIGNIFICANT CHANGE UP (ref 27–34)
MCHC RBC-ENTMCNC: 31 PG — SIGNIFICANT CHANGE UP (ref 27–34)
MCHC RBC-ENTMCNC: 32.7 % — SIGNIFICANT CHANGE UP (ref 32–36)
MCHC RBC-ENTMCNC: 33.2 % — SIGNIFICANT CHANGE UP (ref 32–36)
MCV RBC AUTO: 93 FL — SIGNIFICANT CHANGE UP (ref 80–100)
MCV RBC AUTO: 93.4 FL — SIGNIFICANT CHANGE UP (ref 80–100)
MONOCYTES # BLD AUTO: 0.53 K/UL — SIGNIFICANT CHANGE UP (ref 0–0.9)
MONOCYTES # BLD AUTO: 0.55 K/UL — SIGNIFICANT CHANGE UP (ref 0–0.9)
MONOCYTES NFR BLD AUTO: 4.6 % — SIGNIFICANT CHANGE UP (ref 2–14)
MONOCYTES NFR BLD AUTO: 4.9 % — SIGNIFICANT CHANGE UP (ref 2–14)
MONOCYTES NFR BLD: 1 % — LOW (ref 2–9)
NEUTROPHIL AB SER-ACNC: 86 % — HIGH (ref 43–77)
NEUTROPHILS # BLD AUTO: 8.35 K/UL — HIGH (ref 1.8–7.4)
NEUTROPHILS # BLD AUTO: 8.98 K/UL — HIGH (ref 1.8–7.4)
NEUTROPHILS NFR BLD AUTO: 74.9 % — SIGNIFICANT CHANGE UP (ref 43–77)
NEUTROPHILS NFR BLD AUTO: 77 % — SIGNIFICANT CHANGE UP (ref 43–77)
NEUTS BAND # BLD: 1 % — SIGNIFICANT CHANGE UP (ref 0–6)
NRBC # FLD: 0 — SIGNIFICANT CHANGE UP
NRBC # FLD: 0 — SIGNIFICANT CHANGE UP
PLATELET # BLD AUTO: 130 K/UL — LOW (ref 150–400)
PLATELET # BLD AUTO: 144 K/UL — LOW (ref 150–400)
PLATELET COUNT - ESTIMATE: SIGNIFICANT CHANGE UP
PMV BLD: 12 FL — SIGNIFICANT CHANGE UP (ref 7–13)
PMV BLD: 12.3 FL — SIGNIFICANT CHANGE UP (ref 7–13)
RBC # BLD: 2.87 M/UL — LOW (ref 3.8–5.2)
RBC # BLD: 3.02 M/UL — LOW (ref 3.8–5.2)
RBC # FLD: 14.4 % — SIGNIFICANT CHANGE UP (ref 10.3–14.5)
RBC # FLD: 14.5 % — SIGNIFICANT CHANGE UP (ref 10.3–14.5)
WBC # BLD: 10.84 K/UL — HIGH (ref 3.8–10.5)
WBC # BLD: 12 K/UL — HIGH (ref 3.8–10.5)
WBC # FLD AUTO: 10.84 K/UL — HIGH (ref 3.8–10.5)
WBC # FLD AUTO: 12 K/UL — HIGH (ref 3.8–10.5)

## 2017-10-09 RX ADMIN — Medication 100 MILLIGRAM(S): at 03:23

## 2017-10-09 RX ADMIN — Medication 975 MILLIGRAM(S): at 10:15

## 2017-10-09 RX ADMIN — Medication 975 MILLIGRAM(S): at 16:48

## 2017-10-09 RX ADMIN — Medication 600 MILLIGRAM(S): at 03:21

## 2017-10-09 RX ADMIN — Medication 600 MILLIGRAM(S): at 11:15

## 2017-10-09 RX ADMIN — Medication 600 MILLIGRAM(S): at 10:15

## 2017-10-09 RX ADMIN — Medication 600 MILLIGRAM(S): at 03:51

## 2017-10-09 RX ADMIN — Medication 1 TABLET(S): at 10:15

## 2017-10-09 RX ADMIN — Medication 975 MILLIGRAM(S): at 11:15

## 2017-10-09 RX ADMIN — Medication 975 MILLIGRAM(S): at 03:21

## 2017-10-09 RX ADMIN — Medication 975 MILLIGRAM(S): at 03:51

## 2017-10-09 NOTE — PROGRESS NOTE ADULT - SUBJECTIVE AND OBJECTIVE BOX
Anesthesia Consultation Note  Indication: Right upper thigh numbness      The patient is PPD #2 with c/o right upper thigh numbness. The patient ambulates without difficulty and has no pain or motor deficits in the RLE.      Impression:  Patient has meralgia paresthetica from probable compression of her lateral femoral cutaneous nerve during her labor.  This is not from her epidural.   At this time her symptoms appear mild and the patient was informed that it would resolve in 4-6 weeks.  Loose fitting clothes may be worn during this time.  If her numbness persists after 6 weeks or her symptoms worsen, she may go to her physician to discuss active treatment modalities.
Patient assessed at 1245 due to reports having dizziness in bathroom when taking a shower. Patient denies any headache, blur vision and/or dizziness at the time of assessment or any other times since delivery. Patient reports having some upper right thigh leg numbness but states she is able to ambulate without difficulty.    Vital signs  Vital Signs Last 24 Hrs  T(C): 36.7 (09 Oct 2017 12:33), Max: 37 (09 Oct 2017 05:37)  T(F): 98.1 (09 Oct 2017 12:33), Max: 98.6 (09 Oct 2017 05:37)  HR: 83 (09 Oct 2017 12:33) (83 - 87)  BP: 105/68 (09 Oct 2017 12:33) (102/59 - 115/73)  BP(mean): --  RR: 20 (09 Oct 2017 12:33) (16 - 20)  SpO2: 100% (09 Oct 2017 12:33) (98% - 100%)    Labs                13.2   14.58 )-----------( 136           ( 06 Oct 2017 20:20 )                40.4	    Medications  MEDICATIONS  (STANDING):  acetaminophen   Tablet. 975 milliGRAM(s) Oral every 6 hours  ibuprofen  Tablet 600 milliGRAM(s) Oral every 6 hours  influenza   Vaccine 0.5 milliLiter(s) IntraMuscular once  oxyCODONE    IR 5 milliGRAM(s) Oral every 3 hours  prenatal multivitamin 1 Tablet(s) Oral daily    MEDICATIONS  (PRN):  dibucaine 1% Ointment 1 Application(s) Topical every 4 hours PRN Perineal Discomfort  diphenhydrAMINE   Capsule 25 milliGRAM(s) Oral every 6 hours PRN Itching  docusate sodium 100 milliGRAM(s) Oral two times a day PRN Stool Softening  glycerin Suppository - Adult 1 Suppository(s) Rectal at bedtime PRN Constipation  hydrocortisone 1% Cream 1 Application(s) Topical every 4 hours PRN perineal discomfort  lanolin Ointment 1 Application(s) Topical every 6 hours PRN Sore Nipples  magnesium hydroxide Suspension 30 milliLiter(s) Oral two times a day PRN Constipation  oxyCODONE    IR 5 milliGRAM(s) Oral every 4 hours PRN Severe Pain (7 -10)  pramoxine 1%/zinc 5% Cream 1 Application(s) Topical every 4 hours PRN perineal discomfort  simethicone 80 milliGRAM(s) Chew every 6 hours PRN Gas  witch hazel Pads 1 Application(s) Topical every 4 hours PRN Perineal Discomfort    Assessment  26 y/o  2day postpartum . EBL 300cc.   Alert and orientedx3. Breast soft, nipples intact. Lung sounds clear bilaterally. Abdomen soft, nontender and nondistended. Perineum WNL, no edema noted. Midline laceration to left of labia majora and right periurethral laceration WNL. Lochia light rubra. Slight edema to lower extremities with negative Beatris's sign and positive pedal pulses. Patient able to elevate left right with not difficulties.     Plan:  Ordered CBC.  Will continue to monitor.   Dizziness episode most likely related to overexertion versus anemia. Right thigh numbness most likely related to epidural residual.   Dr. Osuna updated on patient's status, history and assessment and agrees with the plan of care.
Patient seen and examined at bedside, no acute overnight events. No acute complaints, pain well controlled. Patient is ambulating, voiding spontaneously, passing flatus, and tolerating regular diet.     Vital Signs Last 24 Hours  T(C): 36.8 (10-08-17 @ 05:25), Max: 37.3 (10-07-17 @ 16:39)  HR: 67 (10-08-17 @ 05:25) (67 - 129)  BP: 100/62 (10-08-17 @ 05:25) (100/62 - 122/56)  RR: 17 (10-08-17 @ 05:25) (16 - 18)  SpO2: 98% (10-08-17 @ 05:25) (98% - 100%)    Physical exam:  General: NAD  Abdomen: Soft, non-tender, non-distended, fundus firm  Pelvic: Lochia wnl    Labs:    Blood Type: O Positive  Antibody Screen: Negative  RPR: Negative               13.2   14.58 )-----------( 136      ( 10-06 @ 20:20 )             40.4         MEDICATIONS  (STANDING):  acetaminophen   Tablet. 975 milliGRAM(s) Oral every 6 hours  diphtheria/tetanus/pertussis (acellular) Vaccine (ADAcel) 0.5 milliLiter(s) IntraMuscular once  ibuprofen  Tablet 600 milliGRAM(s) Oral every 6 hours  influenza   Vaccine 0.5 milliLiter(s) IntraMuscular once  oxyCODONE    IR 5 milliGRAM(s) Oral every 3 hours  prenatal multivitamin 1 Tablet(s) Oral daily    MEDICATIONS  (PRN):  dibucaine 1% Ointment 1 Application(s) Topical every 4 hours PRN Perineal Discomfort  diphenhydrAMINE   Capsule 25 milliGRAM(s) Oral every 6 hours PRN Itching  docusate sodium 100 milliGRAM(s) Oral two times a day PRN Stool Softening  glycerin Suppository - Adult 1 Suppository(s) Rectal at bedtime PRN Constipation  hydrocortisone 1% Cream 1 Application(s) Topical every 4 hours PRN perineal discomfort  lanolin Ointment 1 Application(s) Topical every 6 hours PRN Sore Nipples  magnesium hydroxide Suspension 30 milliLiter(s) Oral two times a day PRN Constipation  oxyCODONE    IR 5 milliGRAM(s) Oral every 4 hours PRN Severe Pain (7 -10)  pramoxine 1%/zinc 5% Cream 1 Application(s) Topical every 4 hours PRN perineal discomfort  simethicone 80 milliGRAM(s) Chew every 6 hours PRN Gas  witch hazel Pads 1 Application(s) Topical every 4 hours PRN Perineal Discomfort
S: Patient doing well. Minimal lochia. Pain controlled. breast feeding fearful for bowel movement    O: Vital Signs Last 24 Hrs  T(C): 36.9 (08 Oct 2017 19:40), Max: 36.9 (07 Oct 2017 22:40)  T(F): 98.5 (08 Oct 2017 19:40), Max: 98.5 (08 Oct 2017 19:40)  HR: 83 (08 Oct 2017 19:40) (67 - 94)  BP: 115/73 (08 Oct 2017 19:40) (100/62 - 116/78)  BP(mean): --  RR: 16 (08 Oct 2017 19:40) (16 - 18)  SpO2: 100% (08 Oct 2017 19:40) (98% - 100%)    Gen: NAD  Abd: soft, NT, ND, fundus firm below umbilicus  Lochia: moderate  Ext: no tenderness    Labs:      ABO Interpretation: O (10-06 @ 20:01)    Rh Interpretation: Positive (10 @ 20:01)    Antibody Screen Negative    The mother BELGICA HARTLEY was asked about circumcision   she  Declines ( x    ) discussed with father who has better command of english demonstrated with video and he declines siting pain as a concern.   She Accepts  (      ) and the concent was signed.    The risk and benifits of the circumcision was discussed .The parent(s) were told that this is an elective procedure. I Explained that circumcision is not necessary or recommended by any agency and that it is either Nondenominational or cosmetic.  The parent(s) acknoldege this, I have also explained that the complications are bleeding, damage to the pelvic organ and possible transfusion with the risk of blood born infections or transfusion reactions. The parents acknolwedge the risk and agree to the transfusion.  the parents have been told that the most common complication is bleeding and it will be controlled with a silvernitrate stick that will leave a black tray on the penis that will resolve in approximately 3-4 weeks. They have accepted the risk and agree to the transfusion.      A: 27y PPD#1 s/p  doing well.     Plan: Continue routine postpartum care. Anticipate d/c home in am.
OB Progress Note:  PPD#2    S: 28yo   PPD#2 s/p . Patient feels well. Pain is well controlled. She is tolerating a regular diet and passing flatus. She is voiding spontaneously, and ambulating without difficulty. Denies CP/SOB. Denies lightheadedness/dizziness. Denies N/V.    O:  Vitals:   Vital Signs Last 24 Hrs  T(C): 37 (09 Oct 2017 05:37), Max: 37 (09 Oct 2017 05:37)  T(F): 98.6 (09 Oct 2017 05:37), Max: 98.6 (09 Oct 2017 05:37)  HR: 86 (09 Oct 2017 05:37) (83 - 94)  BP: 102/59 (09 Oct 2017 05:37) (102/59 - 116/78)  BP(mean): --  RR: 17 (09 Oct 2017 05:37) (16 - 18)  SpO2: 98% (09 Oct 2017 05:37) (98% - 100%)    MEDICATIONS  (STANDING):  acetaminophen   Tablet. 975 milliGRAM(s) Oral every 6 hours  ibuprofen  Tablet 600 milliGRAM(s) Oral every 6 hours  influenza   Vaccine 0.5 milliLiter(s) IntraMuscular once  oxyCODONE    IR 5 milliGRAM(s) Oral every 3 hours  prenatal multivitamin 1 Tablet(s) Oral daily    MEDICATIONS  (PRN):  dibucaine 1% Ointment 1 Application(s) Topical every 4 hours PRN Perineal Discomfort  diphenhydrAMINE   Capsule 25 milliGRAM(s) Oral every 6 hours PRN Itching  docusate sodium 100 milliGRAM(s) Oral two times a day PRN Stool Softening  glycerin Suppository - Adult 1 Suppository(s) Rectal at bedtime PRN Constipation  hydrocortisone 1% Cream 1 Application(s) Topical every 4 hours PRN perineal discomfort  lanolin Ointment 1 Application(s) Topical every 6 hours PRN Sore Nipples  magnesium hydroxide Suspension 30 milliLiter(s) Oral two times a day PRN Constipation  oxyCODONE    IR 5 milliGRAM(s) Oral every 4 hours PRN Severe Pain (7 -10)  pramoxine 1%/zinc 5% Cream 1 Application(s) Topical every 4 hours PRN perineal discomfort  simethicone 80 milliGRAM(s) Chew every 6 hours PRN Gas  witch hazel Pads 1 Application(s) Topical every 4 hours PRN Perineal Discomfort      Labs:  Blood type: O Positive  Rubella IgG: RPR: Negative                          13.2   14.58<H> >-----------< 136<L>    ( 10-06 @ 20:20 )             40.4                  Physical Exam:  General: NAD  Abdomen: soft, non-tender, non-distended, fundus firm  Vaginal: Lochia wnl  Extremities: No erythema/edema          Janie Encinas, PGY-1

## 2017-10-09 NOTE — PROGRESS NOTE ADULT - PROBLEM SELECTOR PLAN 1
- Pain well controlled, continue current pain regimen  - Increase ambulation, SCDs when not ambulating  - Continue regular diet  - Discharge planning
- Continue with po analgesia PRN  - Increase ambulation  - Continue regular diet  - IV lock    Hernesto Cunningham PGY1

## 2017-10-09 NOTE — PROGRESS NOTE ADULT - ASSESSMENT
28y/o  PPD#1 from  c/b intrapartum temp in stable condition and afebrile.
28yo  PPD#2 s/p .  Patient is stable and doing well post-partum.

## 2017-10-09 NOTE — PROVIDER CONTACT NOTE (OTHER) - ASSESSMENT
vital signs stable as per flow sheet. pt denies lightheadness/ dizziness at present time. light- moderate lochia. uterus firm one below umbilicus

## 2018-07-01 ENCOUNTER — OUTPATIENT (OUTPATIENT)
Dept: OUTPATIENT SERVICES | Facility: HOSPITAL | Age: 28
LOS: 1 days | End: 2018-07-01
Payer: MEDICAID

## 2018-07-01 PROCEDURE — G9001: CPT

## 2018-07-17 ENCOUNTER — EMERGENCY (EMERGENCY)
Facility: HOSPITAL | Age: 28
LOS: 1 days | Discharge: HOME | End: 2018-07-17
Attending: EMERGENCY MEDICINE | Admitting: EMERGENCY MEDICINE
Payer: MEDICAID

## 2018-07-17 VITALS
DIASTOLIC BLOOD PRESSURE: 89 MMHG | OXYGEN SATURATION: 100 % | HEART RATE: 122 BPM | RESPIRATION RATE: 30 BRPM | TEMPERATURE: 98 F | SYSTOLIC BLOOD PRESSURE: 117 MMHG

## 2018-07-17 VITALS
OXYGEN SATURATION: 100 % | HEART RATE: 73 BPM | TEMPERATURE: 98 F | SYSTOLIC BLOOD PRESSURE: 107 MMHG | RESPIRATION RATE: 18 BRPM | DIASTOLIC BLOOD PRESSURE: 64 MMHG

## 2018-07-17 LAB
ALBUMIN SERPL ELPH-MCNC: 3.9 G/DL — SIGNIFICANT CHANGE UP (ref 3.3–5)
ALP SERPL-CCNC: 96 U/L — SIGNIFICANT CHANGE UP (ref 40–120)
ALT FLD-CCNC: 9 U/L — SIGNIFICANT CHANGE UP (ref 4–33)
AST SERPL-CCNC: 13 U/L — SIGNIFICANT CHANGE UP (ref 4–32)
BASOPHILS # BLD AUTO: 0.03 K/UL — SIGNIFICANT CHANGE UP (ref 0–0.2)
BASOPHILS NFR BLD AUTO: 0.2 % — SIGNIFICANT CHANGE UP (ref 0–2)
BILIRUB SERPL-MCNC: 0.3 MG/DL — SIGNIFICANT CHANGE UP (ref 0.2–1.2)
BUN SERPL-MCNC: 8 MG/DL — SIGNIFICANT CHANGE UP (ref 7–23)
CALCIUM SERPL-MCNC: 9.5 MG/DL — SIGNIFICANT CHANGE UP (ref 8.4–10.5)
CHLORIDE SERPL-SCNC: 105 MMOL/L — SIGNIFICANT CHANGE UP (ref 98–107)
CO2 SERPL-SCNC: 24 MMOL/L — SIGNIFICANT CHANGE UP (ref 22–31)
CREAT SERPL-MCNC: 0.82 MG/DL — SIGNIFICANT CHANGE UP (ref 0.5–1.3)
EOSINOPHIL # BLD AUTO: 0.02 K/UL — SIGNIFICANT CHANGE UP (ref 0–0.5)
EOSINOPHIL NFR BLD AUTO: 0.2 % — SIGNIFICANT CHANGE UP (ref 0–6)
GLUCOSE SERPL-MCNC: 98 MG/DL — SIGNIFICANT CHANGE UP (ref 70–99)
HCT VFR BLD CALC: 36 % — SIGNIFICANT CHANGE UP (ref 34.5–45)
HGB BLD-MCNC: 11.1 G/DL — LOW (ref 11.5–15.5)
IMM GRANULOCYTES # BLD AUTO: 0.04 # — SIGNIFICANT CHANGE UP
IMM GRANULOCYTES NFR BLD AUTO: 0.3 % — SIGNIFICANT CHANGE UP (ref 0–1.5)
LYMPHOCYTES # BLD AUTO: 1.86 K/UL — SIGNIFICANT CHANGE UP (ref 1–3.3)
LYMPHOCYTES # BLD AUTO: 14.4 % — SIGNIFICANT CHANGE UP (ref 13–44)
MCHC RBC-ENTMCNC: 24.6 PG — LOW (ref 27–34)
MCHC RBC-ENTMCNC: 30.8 % — LOW (ref 32–36)
MCV RBC AUTO: 79.6 FL — LOW (ref 80–100)
MONOCYTES # BLD AUTO: 0.65 K/UL — SIGNIFICANT CHANGE UP (ref 0–0.9)
MONOCYTES NFR BLD AUTO: 5 % — SIGNIFICANT CHANGE UP (ref 2–14)
NEUTROPHILS # BLD AUTO: 10.31 K/UL — HIGH (ref 1.8–7.4)
NEUTROPHILS NFR BLD AUTO: 79.9 % — HIGH (ref 43–77)
NRBC # FLD: 0 — SIGNIFICANT CHANGE UP
PLATELET # BLD AUTO: 216 K/UL — SIGNIFICANT CHANGE UP (ref 150–400)
PMV BLD: 11.1 FL — SIGNIFICANT CHANGE UP (ref 7–13)
POTASSIUM SERPL-MCNC: 3.9 MMOL/L — SIGNIFICANT CHANGE UP (ref 3.5–5.3)
POTASSIUM SERPL-SCNC: 3.9 MMOL/L — SIGNIFICANT CHANGE UP (ref 3.5–5.3)
PROT SERPL-MCNC: 7.3 G/DL — SIGNIFICANT CHANGE UP (ref 6–8.3)
RBC # BLD: 4.52 M/UL — SIGNIFICANT CHANGE UP (ref 3.8–5.2)
RBC # FLD: 14.4 % — SIGNIFICANT CHANGE UP (ref 10.3–14.5)
SODIUM SERPL-SCNC: 140 MMOL/L — SIGNIFICANT CHANGE UP (ref 135–145)
TSH SERPL-MCNC: 3.01 UIU/ML — SIGNIFICANT CHANGE UP (ref 0.27–4.2)
WBC # BLD: 12.91 K/UL — HIGH (ref 3.8–10.5)
WBC # FLD AUTO: 12.91 K/UL — HIGH (ref 3.8–10.5)

## 2018-07-17 PROCEDURE — 93010 ELECTROCARDIOGRAM REPORT: CPT

## 2018-07-17 PROCEDURE — 99284 EMERGENCY DEPT VISIT MOD MDM: CPT | Mod: 25

## 2018-07-17 RX ORDER — SODIUM CHLORIDE 9 MG/ML
1000 INJECTION INTRAMUSCULAR; INTRAVENOUS; SUBCUTANEOUS ONCE
Qty: 0 | Refills: 0 | Status: COMPLETED | OUTPATIENT
Start: 2018-07-17 | End: 2018-07-17

## 2018-07-17 RX ADMIN — SODIUM CHLORIDE 1000 MILLILITER(S): 9 INJECTION INTRAMUSCULAR; INTRAVENOUS; SUBCUTANEOUS at 03:05

## 2018-07-17 NOTE — ED ADULT NURSE NOTE - CHIEF COMPLAINT QUOTE
Pt. brought to [facility] ED. Pt. was watching television with  when all of a sudden she had difficulty breathing. Pt. will not answer questions. Is crying at triage. Will not make eye contact.  said something similar occurred in [place] a few years ago and pt. was diagnosed with a panic attack. Seen by Dr. Almaraz and refered to the Main ED.  in the field.

## 2018-07-17 NOTE — ED ADULT TRIAGE NOTE - CHIEF COMPLAINT QUOTE
Pt. brought to [facility] ED. Pt. was watching television with  when all of a sudden she had difficulty breathing. Pt. will not answer questions. Is crying at triage. Will not make eye contact.  said something similar occurred in [place] a few years ago and pt. was diagnosed with a panic attack. Seen by Dr. Almaraz and refered to the Main ED.  in the field.

## 2018-07-17 NOTE — ED PROVIDER NOTE - OBJECTIVE STATEMENT
27F  PMh hypothyroid p/w "throat problem" SOB/tingling. Pt was in usual state of health, was sitting on couch talking w/  when ~2200 felt like throat sensation/throat closing/difficulty breathing. Also b/l hand tingling. Symptoms now almost completely resolved and pt now only feels slight throat pain, worse w/ swallowing, and also minimal lightheaded w/ position change. Denies FB sensation. Denies voice changes, CP, f/c, URI symptoms, NVD, abd pain, urinary complaints, black/bloody stool, LE pain/swelling, hormone use, recent travel/immobilization. No pruritis or rashes. 27F  PMh hypothyroid p/w "throat problem" SOB/tingling. Pt was in usual state of health, was sitting on couch talking w/  when ~22:00 felt like throat sensation/throat closing/difficulty breathing. Also b/l hand tingling. Symptoms now almost completely resolved and pt now only feels slight throat pain, worse w/ swallowing, and also minimal lightheaded w/ position change. Denies FB sensation. Denies voice changes, CP, f/c, URI symptoms, NVD, abd pain, urinary complaints, black/bloody stool, LE pain/swelling, hormone use, recent travel/immobilization. No pruritis or rashes.

## 2018-07-17 NOTE — ED PROVIDER NOTE - PHYSICAL EXAMINATION
no LE edema, normal equal distal pulses.   No spinal ttp, neck FROM. Strength 5/5. No bony ttp, FROM all extremities. Normal equal distal pulses. Steady unassisted gait. sensation intact.  no neck masses, no stridor/drooling, normal swallowing.

## 2018-07-17 NOTE — ED PROVIDER NOTE - PLAN OF CARE
-- Please follow up with your doctor(s) within the next 3 days, but seek medical care sooner if your symptoms worsen. Call for an appointment as soon as possible.  -- You were given results from any tests performed in the Emergency Department which have results available. Show these to your doctor(s). Some of the tests we sent may not have results yet so please call or have your doctor call the Emergency Department to follow up on all results.  -- If you have worsening or concerning symptoms, see your doctor right away or return to the Emergency Department immediately.  -- Please continue taking your home medications as directed. Don't use alcohol when taking any medication (especially antibiotics, Tylenol or pain medication) unless you check with a doctor/pharmacist.

## 2018-07-17 NOTE — ED PROVIDER NOTE - CARE PLAN
Principal Discharge DX:	Shortness of breath Principal Discharge DX:	Shortness of breath  Assessment and plan of treatment:	-- Please follow up with your doctor(s) within the next 3 days, but seek medical care sooner if your symptoms worsen. Call for an appointment as soon as possible.  -- You were given results from any tests performed in the Emergency Department which have results available. Show these to your doctor(s). Some of the tests we sent may not have results yet so please call or have your doctor call the Emergency Department to follow up on all results.  -- If you have worsening or concerning symptoms, see your doctor right away or return to the Emergency Department immediately.  -- Please continue taking your home medications as directed. Don't use alcohol when taking any medication (especially antibiotics, Tylenol or pain medication) unless you check with a doctor/pharmacist.

## 2018-07-17 NOTE — ED PROVIDER NOTE - ATTENDING CONTRIBUTION TO CARE
27F PMh hypothyroid p/w episode throat sensation/throat closing/difficulty breathing. Also b/l hand tingling. Symptoms now almost completely resolved and pt now only feels slight throat pain, worse w/ swallowing, and also minimal lightheaded w/ position change. Initial triage tachycardia and tachypnea self resolved, other vitals wnl. Exam as above. Pt was hyperventilating in triage. EKG wnl.  ddx: Unclear etiology. Clinically benign, possible metabolic or anxiety component. clinically not ACS, PE, tamponade, dissection, PTX, perf, myocarditis, mediastinitis.   CBC cmp, TSH. IVF, reassess.

## 2018-07-17 NOTE — ED ADULT TRIAGE NOTE - MODE OF ARRIVAL
Date: 4/26/2018  PCP: Chris Singer MD    Chief Complaint   Patient presents with   • Establish Care   • Anxiety   • Depression       HISTORY OF PRESENT ILLNESS: Karla Jones is a 48 year old female presenting to clinic for establishment of care.      Anxiety/Depression: diagnosed a year ago. Patient reports that she has had several stressors occurring over a short period of time. Patient reports little interest or pleasure in things she usually likes to do, depressed mood, difficulty falling asleep and staying asleep, decreased energy, decreased appetite, decreased concentration, irritability, anxiety. Denies any symptoms of suicidal or homicidal ideation. Patient previously on Zoloft and Paxil, both of which were ineffective. Patient was also started on Lexapro, however was not covered by her insurance and was too expensive for her. PH Q9 score was 21, PAM 7 score was 21.    GERD: Maintained on Prilosec 20 mg daily, reports compliance with medications. Denies any symptoms of heartburn.     COPD: patient has never been on a controller inhaler in the past. Patient uses Albuterol inhaler needed, which is 2-3 times a day for symptoms of SOB and wheezing.     No other concerns today.       Complete review of systems completed and is otherwise negative except for those mentioned in the history of presenting complaints.   Past Medical History, Surgical History, Social History and Allergies were all reviewed and updated within EPIC.    MEDS:    Current Outpatient Prescriptions   Medication Sig   • omeprazole (PRILOSEC) 40 MG capsule 1 tab po q hs   • albuterol 108 (90 BASE) MCG/ACT inhaler Inhale 2 puffs into the lungs every 4 hours as needed for Shortness of Breath or Wheezing.   • amitriptyline (ELAVIL) 25 MG tablet 1 tab by mouth nightly, increase to 2 tabs by mouth nightly in 2 weeks   • tiotropium (SPIRIVA HANDIHALER) 18 MCG capsule for inhaler Place 1 capsule into inhaler and inhale daily.   • predniSONE  (DELTASONE) 20 MG tablet 40 mg/d x3d then 20mg qdx3d then 10mg qdx3d then stop   • azithromycin (ZITHROMAX) 250 MG tablet Take 2 tablet on day 1 then 1 tablet once a day for 4 days.   • escitalopram (LEXAPRO) 10 MG tablet Take 1 tablet by mouth daily.     No current facility-administered medications for this visit.      PHYSICAL EXAM:  Visit Vitals  /74 (BP Location: [facility] [place], Patient Position: Sitting, Cuff Size: Regular)   Pulse 72   Temp 98.5 °F (36.9 °C) (Temporal Artery)   Ht 5' 1\" (1.549 m)   Wt 72.1 kg   BMI 30.04 kg/m²     General: No acute distress, well hydrated, well groomed.  HEENT: Mucous membranes moist, neck supple, no cervical or supraclavicular lymphadenopathy, TMs clear bilaterally, pharynx without erythema, swelling, exudate, nasal turbinates normal bilaterally, no thyromegaly appreciated   CV: regular rate and rhythm, normal S1/S2, no murmurs/rubs/gallops, non-displaced PMI  Resp: RR- 16, good air entry, CTAB, no wheeze/rales/rhonchi.  Abd: Soft, nontender/non-distended, normoactive bowel sounds, no rebound or guarding, no hepatosplenomegaly.  Ext: No cyanosis or edema, moves all extremities.  Neuro: no focal deficits   Psych: Mood and affect appropriate.    ASSESSMENT & PLAN:  This patient is a 48 year old female presenting with:    Anxiety/Depression:  - Discussed treatment options  - Start Elavil 25 mg daily, increase to 50 mg daily after 2 weeks if no improvement of symptoms  - Referral to behavioral health  - Follow-up in one month    COPD:  - Advair started   - Continue Albuterol as needed     - Discussed signs and symptoms that would warrant immediate evaluation.      GERD:   - Continue Prilosec as needed     Health Maintenance:  Up to date     FOLLOW-UP:  Patient is to return in 1 month or sooner as needed.     Gabino Ambrose MD  4/26/2018     EMS

## 2018-07-17 NOTE — ED ADULT NURSE NOTE - OBJECTIVE STATEMENT
Pt received trauma room A for panic attack; Pt states she had difficulty breathing, palpitations at time of onset. Pt currently A&Ox4, appears calm and cooperative. Md evaluated. VS as noted. Denies CP, SOB, pain at present time. peripheral Iv inserted, labs sent. EKG completed. Will continue to monitor.

## 2018-07-17 NOTE — ED PROVIDER NOTE - PROGRESS NOTE DETAILS
Pt seen & reassessed.  Pt symptomatically improved.   A&Ox3, NAD, VSS, pt tolerated PO & ambulated w/steady unassisted gait in the ED.  Discussed results of ED w/u w/patient, and gave them a copy of results. Patient verbalized understanding of hospital course & f/u plans, has decisional making capacity.  Pt st they will f/u w/PMD within the next 3 days; pt agrees to call today or tomorrow for an appointment. Pt agrees to return to the ED if there is any worsening or concerning symptoms.

## 2018-07-23 DIAGNOSIS — Z71.89 OTHER SPECIFIED COUNSELING: ICD-10-CM

## 2018-10-31 ENCOUNTER — RESULT REVIEW (OUTPATIENT)
Age: 28
End: 2018-10-31

## 2018-11-15 ENCOUNTER — APPOINTMENT (OUTPATIENT)
Dept: ANTEPARTUM | Facility: CLINIC | Age: 28
End: 2018-11-15
Payer: MEDICAID

## 2018-11-15 ENCOUNTER — ASOB RESULT (OUTPATIENT)
Age: 28
End: 2018-11-15

## 2018-11-15 PROCEDURE — 76801 OB US < 14 WKS SINGLE FETUS: CPT

## 2018-11-15 PROCEDURE — 76813 OB US NUCHAL MEAS 1 GEST: CPT

## 2018-11-15 PROCEDURE — 36416 COLLJ CAPILLARY BLOOD SPEC: CPT

## 2018-11-17 ENCOUNTER — EMERGENCY (EMERGENCY)
Facility: HOSPITAL | Age: 28
LOS: 1 days | Discharge: HOME | End: 2018-11-17
Admitting: EMERGENCY MEDICINE
Payer: MEDICAID

## 2018-11-17 VITALS
DIASTOLIC BLOOD PRESSURE: 63 MMHG | HEART RATE: 100 BPM | TEMPERATURE: 97 F | OXYGEN SATURATION: 100 % | SYSTOLIC BLOOD PRESSURE: 124 MMHG | RESPIRATION RATE: 18 BRPM

## 2018-11-17 VITALS
OXYGEN SATURATION: 99 % | RESPIRATION RATE: 16 BRPM | HEART RATE: 96 BPM | TEMPERATURE: 99 F | DIASTOLIC BLOOD PRESSURE: 72 MMHG | SYSTOLIC BLOOD PRESSURE: 102 MMHG

## 2018-11-17 PROCEDURE — 99283 EMERGENCY DEPT VISIT LOW MDM: CPT

## 2018-11-17 RX ORDER — AMOXICILLIN 250 MG/5ML
1 SUSPENSION, RECONSTITUTED, ORAL (ML) ORAL
Qty: 21 | Refills: 0 | OUTPATIENT
Start: 2018-11-17 | End: 2018-11-23

## 2018-11-17 RX ORDER — ALBUTEROL 90 UG/1
2 AEROSOL, METERED ORAL
Qty: 1 | Refills: 0
Start: 2018-11-17 | End: 2018-11-21

## 2018-11-17 RX ORDER — AMOXICILLIN 250 MG/5ML
500 SUSPENSION, RECONSTITUTED, ORAL (ML) ORAL ONCE
Qty: 0 | Refills: 0 | Status: COMPLETED | OUTPATIENT
Start: 2018-11-17 | End: 2018-11-17

## 2018-11-17 RX ORDER — ALBUTEROL 90 UG/1
2.5 AEROSOL, METERED ORAL ONCE
Qty: 0 | Refills: 0 | Status: COMPLETED | OUTPATIENT
Start: 2018-11-17 | End: 2018-11-17

## 2018-11-17 RX ADMIN — Medication 500 MILLIGRAM(S): at 15:07

## 2018-11-17 RX ADMIN — ALBUTEROL 2.5 MILLIGRAM(S): 90 AEROSOL, METERED ORAL at 15:07

## 2018-11-17 NOTE — ED ADULT TRIAGE NOTE - CHIEF COMPLAINT QUOTE
Pt is ~12 weeks pregnant, c/o cough/cold/headache x3 days.  Pt saw PMD and was told to take Tylenol.  Pt denies relief.

## 2018-11-17 NOTE — ED PROVIDER NOTE - CARE PLAN
Principal Discharge DX:	Otitis media  Secondary Diagnosis:	Upper respiratory infection  Secondary Diagnosis:	12 weeks gestation of pregnancy

## 2018-11-17 NOTE — ED PROVIDER NOTE - NSFOLLOWUPINSTRUCTIONS_ED_ALL_ED_FT
Follow up with your OBGYN within 1-2 days. Rest, increase your fluids. Use the Albuterol inhaler 2 inhalations every 6 hrs as needed for tightness/cough. Take Amoxicillin 500mg 1 tab 3x/day for 7 days. Worsening, continued or ANY new concerning symptoms return to the emergency department.

## 2018-11-17 NOTE — ED PROVIDER NOTE - PROGRESS NOTE DETAILS
at bedside Pt feeling better s/p Albuterol neb and Amox. Will give rx's for albuterol inhaler and Amox to continue at home with OBGYN follow up. Strict return precautions discussed.

## 2018-11-17 NOTE — ED PROVIDER NOTE - MEDICAL DECISION MAKING DETAILS
60 y/o F 12 weeks pregnant  LMP  OBGYN Jennifer Osuna presenting with cough, nasal congestion, left sided head pressure and left ear pain exam (+) for an otitis media developing- Albuterol neb, Amoxicillin, obtain FHR, Supportive care, PMD/OBGYN follow up

## 2018-11-17 NOTE — ED PROVIDER NOTE - OBJECTIVE STATEMENT
27 y/o F 12 weeks pregnant  LMP  OBGYN Jennifer Osuna presenting with cough, nasal congestion, left sided head pressure and left ear pain. Pt seen by PMD and told to take Tylenol. States cough is dry keeping her up at night. Denies fever, chills, abdominal pain, nausea, vomiting, weakness, vaginal discharge, vaginal bleeding. Appears well.

## 2018-11-25 ENCOUNTER — EMERGENCY (EMERGENCY)
Facility: HOSPITAL | Age: 28
LOS: 1 days | Discharge: HOME | End: 2018-11-25
Attending: EMERGENCY MEDICINE | Admitting: EMERGENCY MEDICINE
Payer: MEDICAID

## 2018-11-25 VITALS
SYSTOLIC BLOOD PRESSURE: 108 MMHG | TEMPERATURE: 98 F | DIASTOLIC BLOOD PRESSURE: 71 MMHG | OXYGEN SATURATION: 99 % | RESPIRATION RATE: 18 BRPM | HEART RATE: 82 BPM

## 2018-11-25 PROCEDURE — 99283 EMERGENCY DEPT VISIT LOW MDM: CPT

## 2018-11-25 RX ORDER — ACETAMINOPHEN 500 MG
975 TABLET ORAL ONCE
Qty: 0 | Refills: 0 | Status: COMPLETED | OUTPATIENT
Start: 2018-11-25 | End: 2018-11-25

## 2018-11-25 RX ORDER — AMOXICILLIN 250 MG/5ML
1 SUSPENSION, RECONSTITUTED, ORAL (ML) ORAL
Qty: 21 | Refills: 0
Start: 2018-11-25 | End: 2018-12-01

## 2018-11-25 RX ADMIN — Medication 975 MILLIGRAM(S): at 10:28

## 2018-11-25 RX ADMIN — Medication 1 TABLET(S): at 10:28

## 2018-11-25 NOTE — ED PROVIDER NOTE - MEDICAL DECISION MAKING DETAILS
28F 13 weeks pregnant, seen here last week for L OM, here with continued L sided head pain in the setting of not taking her abx as prescribed.  No F/C.  No mastoid pain or bogginess.  HDS, NAD, MMM, eyes clear, L TM injected, no pain with ear traction, L mastoid not boggy or tender, R TM with cerumen, teeth NTTP, no fluctuance, lungs CTAB, heart sounds normal, abd soft, NT, ND, no CVAT, LEs without edema, wwp, skin normal temperature and color, neuro: alert and oriented, no focal deficits.  Emphasized need to take abx as prescribed and pain control.  Will refer to ENT and to dental since she has also not seen a dentist in years.

## 2018-11-25 NOTE — ED PROVIDER NOTE - NSFOLLOWUPINSTRUCTIONS_ED_ALL_ED_FT
You have been seen for otitis media, an ear infection.  Please do the followin) STOP taking amoxicillin.  2) DO take augmentin (amoxicillin-clavulinate) 875mg by mouth twice daily for 10 days.  3) DO take tylenol 975mg by mouth three times daily as needed for pain.  4) Please call tomorrow morning to make an appointment with the ENT doctor and the dental clinic.    Return to the ED for pain, inability to tolerate fluids by mouth, other concerns.

## 2018-11-25 NOTE — ED ADULT TRIAGE NOTE - CHIEF COMPLAINT QUOTE
Patient has c/o left sided head, eye and face pain since yesterday. She is 13 weeks pregnant and says she has a toothache on that side.

## 2018-11-25 NOTE — ED PROVIDER NOTE - OBJECTIVE STATEMENT
28F 13 weeks pregnant, seen here last week for L OM, here with continued L sided head pain in the setting of not taking her abx as prescribed.  No F/C.  No mastoid pain or bogginess.

## 2018-11-25 NOTE — ED PROVIDER NOTE - PHYSICAL EXAMINATION
HDS, NAD, MMM, eyes clear, L TM injected, no pain with ear traction, L mastoid not boggy or tender, R TM with cerumen, teeth NTTP, no fluctuance, lungs CTAB, heart sounds normal, abd soft, NT, ND, no CVAT, LEs without edema, wwp, skin normal temperature and color, neuro: alert and oriented, no focal deficits

## 2019-01-16 ENCOUNTER — APPOINTMENT (OUTPATIENT)
Dept: ANTEPARTUM | Facility: CLINIC | Age: 29
End: 2019-01-16
Payer: MEDICAID

## 2019-01-16 ENCOUNTER — ASOB RESULT (OUTPATIENT)
Age: 29
End: 2019-01-16

## 2019-01-16 PROCEDURE — 76817 TRANSVAGINAL US OBSTETRIC: CPT | Mod: 59

## 2019-01-16 PROCEDURE — 76811 OB US DETAILED SNGL FETUS: CPT

## 2019-01-24 ENCOUNTER — APPOINTMENT (OUTPATIENT)
Dept: PEDIATRIC CARDIOLOGY | Facility: CLINIC | Age: 29
End: 2019-01-24
Payer: MEDICAID

## 2019-01-24 ENCOUNTER — OUTPATIENT (OUTPATIENT)
Dept: OUTPATIENT SERVICES | Age: 29
LOS: 1 days | Discharge: HOME | End: 2019-01-24

## 2019-01-24 PROCEDURE — 76820 UMBILICAL ARTERY ECHO: CPT

## 2019-01-24 PROCEDURE — 99202 OFFICE O/P NEW SF 15 MIN: CPT | Mod: 25

## 2019-01-24 PROCEDURE — 76827 ECHO EXAM OF FETAL HEART: CPT

## 2019-01-24 PROCEDURE — 76821 MIDDLE CEREBRAL ARTERY ECHO: CPT

## 2019-01-24 PROCEDURE — 93325 DOPPLER ECHO COLOR FLOW MAPG: CPT | Mod: 59

## 2019-01-24 PROCEDURE — 76825 ECHO EXAM OF FETAL HEART: CPT

## 2019-03-13 ENCOUNTER — APPOINTMENT (OUTPATIENT)
Dept: ANTEPARTUM | Facility: CLINIC | Age: 29
End: 2019-03-13
Payer: MEDICAID

## 2019-03-13 ENCOUNTER — ASOB RESULT (OUTPATIENT)
Age: 29
End: 2019-03-13

## 2019-03-13 PROCEDURE — 76816 OB US FOLLOW-UP PER FETUS: CPT

## 2019-03-28 ENCOUNTER — APPOINTMENT (OUTPATIENT)
Dept: OBGYN | Facility: CLINIC | Age: 29
End: 2019-03-28

## 2019-04-04 ENCOUNTER — APPOINTMENT (OUTPATIENT)
Dept: OBGYN | Facility: CLINIC | Age: 29
End: 2019-04-04
Payer: MEDICAID

## 2019-04-04 ENCOUNTER — NON-APPOINTMENT (OUTPATIENT)
Age: 29
End: 2019-04-04

## 2019-04-04 VITALS
HEIGHT: 62 IN | SYSTOLIC BLOOD PRESSURE: 109 MMHG | DIASTOLIC BLOOD PRESSURE: 71 MMHG | WEIGHT: 144.06 LBS | BODY MASS INDEX: 26.51 KG/M2

## 2019-04-04 PROCEDURE — 0502F SUBSEQUENT PRENATAL CARE: CPT

## 2019-04-04 RX ORDER — LEVOTHYROXINE SODIUM 0.05 MG/1
50 TABLET ORAL
Qty: 30 | Refills: 0 | Status: ACTIVE | COMMUNITY
Start: 2019-03-27

## 2019-04-04 RX ORDER — AMOXICILLIN 500 MG/1
500 CAPSULE ORAL
Qty: 21 | Refills: 0 | Status: COMPLETED | COMMUNITY
Start: 2018-11-26

## 2019-04-04 RX ORDER — ALBUTEROL SULFATE 90 UG/1
108 (90 BASE) AEROSOL, METERED RESPIRATORY (INHALATION)
Qty: 18 | Refills: 0 | Status: COMPLETED | COMMUNITY
Start: 2018-11-17

## 2019-04-07 LAB — BACTERIA UR CULT: NORMAL

## 2019-04-10 ENCOUNTER — ASOB RESULT (OUTPATIENT)
Age: 29
End: 2019-04-10

## 2019-04-10 ENCOUNTER — APPOINTMENT (OUTPATIENT)
Dept: ANTEPARTUM | Facility: CLINIC | Age: 29
End: 2019-04-10
Payer: MEDICAID

## 2019-04-10 PROCEDURE — 76816 OB US FOLLOW-UP PER FETUS: CPT

## 2019-04-17 ENCOUNTER — APPOINTMENT (OUTPATIENT)
Dept: OBGYN | Facility: CLINIC | Age: 29
End: 2019-04-17
Payer: MEDICAID

## 2019-04-17 ENCOUNTER — NON-APPOINTMENT (OUTPATIENT)
Age: 29
End: 2019-04-17

## 2019-04-17 VITALS
WEIGHT: 144 LBS | HEIGHT: 62 IN | BODY MASS INDEX: 26.5 KG/M2 | SYSTOLIC BLOOD PRESSURE: 101 MMHG | DIASTOLIC BLOOD PRESSURE: 67 MMHG

## 2019-04-17 PROCEDURE — 0502F SUBSEQUENT PRENATAL CARE: CPT

## 2019-05-01 ENCOUNTER — MED ADMIN CHARGE (OUTPATIENT)
Age: 29
End: 2019-05-01

## 2019-05-01 ENCOUNTER — NON-APPOINTMENT (OUTPATIENT)
Age: 29
End: 2019-05-01

## 2019-05-01 ENCOUNTER — APPOINTMENT (OUTPATIENT)
Dept: OBGYN | Facility: CLINIC | Age: 29
End: 2019-05-01
Payer: MEDICAID

## 2019-05-01 VITALS
DIASTOLIC BLOOD PRESSURE: 71 MMHG | WEIGHT: 147 LBS | BODY MASS INDEX: 27.05 KG/M2 | HEIGHT: 62 IN | SYSTOLIC BLOOD PRESSURE: 105 MMHG

## 2019-05-01 PROCEDURE — 90715 TDAP VACCINE 7 YRS/> IM: CPT

## 2019-05-01 PROCEDURE — 90471 IMMUNIZATION ADMIN: CPT

## 2019-05-01 PROCEDURE — 0502F SUBSEQUENT PRENATAL CARE: CPT

## 2019-05-07 ENCOUNTER — APPOINTMENT (OUTPATIENT)
Dept: OBGYN | Facility: CLINIC | Age: 29
End: 2019-05-07

## 2019-05-08 ENCOUNTER — APPOINTMENT (OUTPATIENT)
Dept: ANTEPARTUM | Facility: CLINIC | Age: 29
End: 2019-05-08
Payer: MEDICAID

## 2019-05-08 ENCOUNTER — APPOINTMENT (OUTPATIENT)
Dept: OBGYN | Facility: CLINIC | Age: 29
End: 2019-05-08

## 2019-05-08 ENCOUNTER — ASOB RESULT (OUTPATIENT)
Age: 29
End: 2019-05-08

## 2019-05-08 PROCEDURE — 76816 OB US FOLLOW-UP PER FETUS: CPT

## 2019-05-09 ENCOUNTER — NON-APPOINTMENT (OUTPATIENT)
Age: 29
End: 2019-05-09

## 2019-05-09 ENCOUNTER — APPOINTMENT (OUTPATIENT)
Dept: OBGYN | Facility: CLINIC | Age: 29
End: 2019-05-09
Payer: MEDICAID

## 2019-05-09 VITALS
WEIGHT: 149 LBS | DIASTOLIC BLOOD PRESSURE: 67 MMHG | SYSTOLIC BLOOD PRESSURE: 101 MMHG | HEIGHT: 62 IN | BODY MASS INDEX: 27.42 KG/M2

## 2019-05-09 LAB
GP B STREP DNA SPEC QL NAA+PROBE: NORMAL
GP B STREP DNA SPEC QL NAA+PROBE: NOT DETECTED
HIV1+2 AB SPEC QL IA.RAPID: NONREACTIVE
SOURCE GBS: NORMAL

## 2019-05-09 PROCEDURE — 0502F SUBSEQUENT PRENATAL CARE: CPT

## 2019-05-15 LAB
MEV IGG FLD QL IA: >300 AU/ML
MEV IGG+IGM SER-IMP: POSITIVE

## 2019-05-16 ENCOUNTER — TRANSCRIPTION ENCOUNTER (OUTPATIENT)
Age: 29
End: 2019-05-16

## 2019-05-16 ENCOUNTER — OUTPATIENT (OUTPATIENT)
Dept: INPATIENT UNIT | Facility: HOSPITAL | Age: 29
LOS: 1 days | End: 2019-05-16

## 2019-05-16 ENCOUNTER — APPOINTMENT (OUTPATIENT)
Dept: OBGYN | Facility: CLINIC | Age: 29
End: 2019-05-16

## 2019-05-16 VITALS
DIASTOLIC BLOOD PRESSURE: 58 MMHG | HEART RATE: 72 BPM | SYSTOLIC BLOOD PRESSURE: 108 MMHG | TEMPERATURE: 98 F | RESPIRATION RATE: 18 BRPM

## 2019-05-16 VITALS — SYSTOLIC BLOOD PRESSURE: 103 MMHG | HEART RATE: 83 BPM | DIASTOLIC BLOOD PRESSURE: 59 MMHG

## 2019-05-16 DIAGNOSIS — O26.899 OTHER SPECIFIED PREGNANCY RELATED CONDITIONS, UNSPECIFIED TRIMESTER: ICD-10-CM

## 2019-05-16 DIAGNOSIS — E03.9 HYPOTHYROIDISM, UNSPECIFIED: ICD-10-CM

## 2019-05-16 DIAGNOSIS — Z3A.00 WEEKS OF GESTATION OF PREGNANCY NOT SPECIFIED: ICD-10-CM

## 2019-05-16 DIAGNOSIS — O47.1 FALSE LABOR AT OR AFTER 37 COMPLETED WEEKS OF GESTATION: ICD-10-CM

## 2019-05-16 LAB
BASOPHILS # BLD AUTO: 0.02 K/UL — SIGNIFICANT CHANGE UP (ref 0–0.2)
BASOPHILS NFR BLD AUTO: 0.2 % — SIGNIFICANT CHANGE UP (ref 0–2)
BLD GP AB SCN SERPL QL: NEGATIVE — SIGNIFICANT CHANGE UP
EOSINOPHIL # BLD AUTO: 0.09 K/UL — SIGNIFICANT CHANGE UP (ref 0–0.5)
EOSINOPHIL NFR BLD AUTO: 0.8 % — SIGNIFICANT CHANGE UP (ref 0–6)
HCT VFR BLD CALC: 38.7 % — SIGNIFICANT CHANGE UP (ref 34.5–45)
HGB BLD-MCNC: 11.8 G/DL — SIGNIFICANT CHANGE UP (ref 11.5–15.5)
IMM GRANULOCYTES NFR BLD AUTO: 1.2 % — SIGNIFICANT CHANGE UP (ref 0–1.5)
LYMPHOCYTES # BLD AUTO: 18.6 % — SIGNIFICANT CHANGE UP (ref 13–44)
LYMPHOCYTES # BLD AUTO: 2.22 K/UL — SIGNIFICANT CHANGE UP (ref 1–3.3)
MCHC RBC-ENTMCNC: 26 PG — LOW (ref 27–34)
MCHC RBC-ENTMCNC: 30.5 % — LOW (ref 32–36)
MCV RBC AUTO: 85.4 FL — SIGNIFICANT CHANGE UP (ref 80–100)
MONOCYTES # BLD AUTO: 0.65 K/UL — SIGNIFICANT CHANGE UP (ref 0–0.9)
MONOCYTES NFR BLD AUTO: 5.4 % — SIGNIFICANT CHANGE UP (ref 2–14)
NEUTROPHILS # BLD AUTO: 8.82 K/UL — HIGH (ref 1.8–7.4)
NEUTROPHILS NFR BLD AUTO: 73.8 % — SIGNIFICANT CHANGE UP (ref 43–77)
NRBC # FLD: 0 K/UL — SIGNIFICANT CHANGE UP (ref 0–0)
PLATELET # BLD AUTO: 168 K/UL — SIGNIFICANT CHANGE UP (ref 150–400)
PMV BLD: 12.1 FL — SIGNIFICANT CHANGE UP (ref 7–13)
RBC # BLD: 4.53 M/UL — SIGNIFICANT CHANGE UP (ref 3.8–5.2)
RBC # FLD: 18.9 % — HIGH (ref 10.3–14.5)
RH IG SCN BLD-IMP: POSITIVE — SIGNIFICANT CHANGE UP
WBC # BLD: 11.94 K/UL — HIGH (ref 3.8–10.5)
WBC # FLD AUTO: 11.94 K/UL — HIGH (ref 3.8–10.5)

## 2019-05-16 RX ORDER — SODIUM CHLORIDE 9 MG/ML
1000 INJECTION, SOLUTION INTRAVENOUS
Refills: 0 | Status: DISCONTINUED | OUTPATIENT
Start: 2019-05-16 | End: 2019-05-16

## 2019-05-16 RX ORDER — OXYTOCIN 10 UNIT/ML
333.33 VIAL (ML) INJECTION
Qty: 20 | Refills: 0 | Status: DISCONTINUED | OUTPATIENT
Start: 2019-05-16 | End: 2019-05-16

## 2019-05-16 RX ADMIN — SODIUM CHLORIDE 125 MILLILITER(S): 9 INJECTION, SOLUTION INTRAVENOUS at 18:43

## 2019-05-16 NOTE — OB RN TRIAGE NOTE - GRAVIDA, OB PROFILE
Pt seen 7.12.18     req refill for metformin 500mg qd     Mail order pharm needs 90 ds    pls advise.    2

## 2019-05-16 NOTE — OB PROVIDER H&P - ASSESSMENT
28 year old female P1 at 38.2 wks in  in early labor and for pain management   case d/w Dr Sauceda     for stadol for pain management   GBS negative

## 2019-05-16 NOTE — DISCHARGE NOTE ANTEPARTUM - PATIENT PORTAL LINK FT
You can access the [facility] Patient Portal, offered by [facility], by registering with the following website: [email or web address]

## 2019-05-16 NOTE — DISCHARGE NOTE ANTEPARTUM - CARE PLAN
Principal Discharge DX:	False labor after 37 completed weeks of gestation  Goal:	return to Labor and Delivery when regular contractions increasing in intensity, leaking fluid, vaginal bleeding, or decreased fetal movements  Assessment and plan of treatment:	Patient was monitored on L&D, with irregular contractions, and no cervical change. Pt was comfortable without pain meds.  return to Labor and Delivery when regular contractions increasing in intensity, leaking fluid, vaginal bleeding, or decreased fetal movements

## 2019-05-16 NOTE — OB PROVIDER H&P - PROBLEM/PLAN-1
Imp/Rx:  Afebrile (but on CVVH).  To complete vanco and continue imipenem for now.  Question of whether the [employer] pathogen is real or a procurement contaminant.  I'm inclined to believe it is probably a contaminant but will await lab ID. DISPLAY PLAN FREE TEXT

## 2019-05-16 NOTE — OB PROVIDER H&P - HISTORY OF PRESENT ILLNESS
28 year old female  P1 at 38.2wks gestation who presents with contrxs q 5 minutes and denied any rom lof vb feels gfm   stated GBS negative and followed for 2 V cord         med hx hypothyroidism  on synthroid    surghx  denied   Ob hx  P1   7.12#

## 2019-05-16 NOTE — DISCHARGE NOTE ANTEPARTUM - HOSPITAL COURSE
Patient presented to L&D with contractions, and was admitted for pain management. Cervix was 3cm dilated on admission. Pt did not receive analgesia, and contractions spaced out. She remained with intact membranes and no vaginal bleeding. She felt active fetal movements.   Patient was re-examined over 5hrs later with unchanged exam. Deemed not in labor at this time. NST was reactive, and BPP 10/10, reassuring fetal status. Patient was counseled on when to return to L&D, and will follow up with her OB as scheduled.

## 2019-05-16 NOTE — DISCHARGE NOTE ANTEPARTUM - MEDICATION SUMMARY - MEDICATIONS TO TAKE
I will START or STAY ON the medications listed below when I get home from the hospital:    ProAir HFA 90 mcg/inh inhalation aerosol  -- 2 puff(s) inhaled every 6 hours -for cough  tightness or cough  -- For inhalation only.  It is very important that you take or use this exactly as directed.  Do not skip doses or discontinue unless directed by your doctor.  Obtain medical advice before taking any non-prescription drugs as some may affect the action of this medication.  Shake well before use.    -- Indication: For asthma    Prenatal 1 oral capsule  -- Indication: For supplement    docusate sodium 100 mg oral capsule  -- 1 cap(s) by mouth 2 times a day, As needed, Stool Softening  -- Indication: For constipation    levothyroxine 50 mcg (0.05 mg) oral capsule  -- 1 cap(s) by mouth once a day  -- Indication: For Hypothyroidism

## 2019-05-16 NOTE — OB PROVIDER TRIAGE NOTE - NSOBPROVIDERNOTE_OBGYN_ALL_OB_FT
8 year old female  P1 at 38.2wks gestation who presents with contrxs q 5 minutes and denied any rom lof vb feels gfm   stated GBS negative and followed for 2 V cord      seen and examined   ve 3-4/70/-3   case d/w Dr Sauceda admitted for Labor    Stadol  for pain

## 2019-05-16 NOTE — DISCHARGE NOTE ANTEPARTUM - PLAN OF CARE
return to Labor and Delivery when regular contractions increasing in intensity, leaking fluid, vaginal bleeding, or decreased fetal movements Patient was monitored on L&D, with irregular contractions, and no cervical change. Pt was comfortable without pain meds.  return to Labor and Delivery when regular contractions increasing in intensity, leaking fluid, vaginal bleeding, or decreased fetal movements

## 2019-05-16 NOTE — DISCHARGE NOTE ANTEPARTUM - CARE PROVIDER_API CALL
Mayte Valenzuela (MD)  Obstetrics and Gynecology  [facility] [place] [address]  Phone: [phone]  Fax: [phone]  Follow Up Time:

## 2019-05-17 ENCOUNTER — OUTPATIENT (OUTPATIENT)
Dept: INPATIENT UNIT | Facility: HOSPITAL | Age: 29
LOS: 1 days | Discharge: HOME | End: 2019-05-17
Payer: MEDICAID

## 2019-05-17 VITALS
TEMPERATURE: 98 F | DIASTOLIC BLOOD PRESSURE: 65 MMHG | RESPIRATION RATE: 18 BRPM | SYSTOLIC BLOOD PRESSURE: 104 MMHG | HEART RATE: 83 BPM

## 2019-05-17 VITALS — HEART RATE: 75 BPM | SYSTOLIC BLOOD PRESSURE: 109 MMHG | DIASTOLIC BLOOD PRESSURE: 65 MMHG

## 2019-05-17 DIAGNOSIS — O26.899 OTHER SPECIFIED PREGNANCY RELATED CONDITIONS, UNSPECIFIED TRIMESTER: ICD-10-CM

## 2019-05-17 DIAGNOSIS — Z3A.00 WEEKS OF GESTATION OF PREGNANCY NOT SPECIFIED: ICD-10-CM

## 2019-05-17 LAB — T PALLIDUM AB TITR SER: NEGATIVE — SIGNIFICANT CHANGE UP

## 2019-05-17 PROCEDURE — 59025 FETAL NON-STRESS TEST: CPT | Mod: 26

## 2019-05-17 NOTE — OB PROVIDER TRIAGE NOTE - NSOBPROVIDERNOTE_OBGYN_ALL_OB_FT
28 yr ol d @ 38.3 wks , r/o labor 28 yr ol d @ 38.3 wks , r/o labor  NST: CAT 1   TOCO: ctx q 6-8 min  Discussed with Dr. Valenzuela  Ambulate in lobby for 2-3 hr  Return for VE  Labor precautions

## 2019-05-17 NOTE — OB PROVIDER TRIAGE NOTE - HISTORY OF PRESENT ILLNESS
28 yr old  @ 38.3 wk presents with ctx since 4 pm. Denies VB/LOF. Reports positive fetal movement.   PNI: 2 VC, hypothyroidism on synthroid

## 2019-05-18 ENCOUNTER — INPATIENT (INPATIENT)
Facility: HOSPITAL | Age: 29
LOS: 1 days | Discharge: HOME | End: 2019-05-20
Attending: OBSTETRICS & GYNECOLOGY | Admitting: OBSTETRICS & GYNECOLOGY
Payer: MEDICAID

## 2019-05-18 ENCOUNTER — TRANSCRIPTION ENCOUNTER (OUTPATIENT)
Age: 29
End: 2019-05-18

## 2019-05-18 VITALS
DIASTOLIC BLOOD PRESSURE: 67 MMHG | TEMPERATURE: 97 F | RESPIRATION RATE: 16 BRPM | SYSTOLIC BLOOD PRESSURE: 103 MMHG | HEART RATE: 76 BPM

## 2019-05-18 DIAGNOSIS — O26.899 OTHER SPECIFIED PREGNANCY RELATED CONDITIONS, UNSPECIFIED TRIMESTER: ICD-10-CM

## 2019-05-18 DIAGNOSIS — Z3A.00 WEEKS OF GESTATION OF PREGNANCY NOT SPECIFIED: ICD-10-CM

## 2019-05-18 LAB
BASOPHILS # BLD AUTO: 0.02 K/UL — SIGNIFICANT CHANGE UP (ref 0–0.2)
BASOPHILS # BLD AUTO: 0.03 K/UL — SIGNIFICANT CHANGE UP (ref 0–0.2)
BASOPHILS NFR BLD AUTO: 0.1 % — SIGNIFICANT CHANGE UP (ref 0–2)
BASOPHILS NFR BLD AUTO: 0.2 % — SIGNIFICANT CHANGE UP (ref 0–2)
BLD GP AB SCN SERPL QL: NEGATIVE — SIGNIFICANT CHANGE UP
EOSINOPHIL # BLD AUTO: 0 K/UL — SIGNIFICANT CHANGE UP (ref 0–0.5)
EOSINOPHIL # BLD AUTO: 0.1 K/UL — SIGNIFICANT CHANGE UP (ref 0–0.5)
EOSINOPHIL NFR BLD AUTO: 0 % — SIGNIFICANT CHANGE UP (ref 0–6)
EOSINOPHIL NFR BLD AUTO: 0.8 % — SIGNIFICANT CHANGE UP (ref 0–6)
HCT VFR BLD CALC: 35.3 % — SIGNIFICANT CHANGE UP (ref 34.5–45)
HCT VFR BLD CALC: 36.4 % — SIGNIFICANT CHANGE UP (ref 34.5–45)
HGB BLD-MCNC: 10.8 G/DL — LOW (ref 11.5–15.5)
HGB BLD-MCNC: 11.4 G/DL — LOW (ref 11.5–15.5)
IMM GRANULOCYTES NFR BLD AUTO: 0.6 % — SIGNIFICANT CHANGE UP (ref 0–1.5)
IMM GRANULOCYTES NFR BLD AUTO: 0.8 % — SIGNIFICANT CHANGE UP (ref 0–1.5)
LYMPHOCYTES # BLD AUTO: 1.12 K/UL — SIGNIFICANT CHANGE UP (ref 1–3.3)
LYMPHOCYTES # BLD AUTO: 1.94 K/UL — SIGNIFICANT CHANGE UP (ref 1–3.3)
LYMPHOCYTES # BLD AUTO: 15.8 % — SIGNIFICANT CHANGE UP (ref 13–44)
LYMPHOCYTES # BLD AUTO: 7.4 % — LOW (ref 13–44)
MCHC RBC-ENTMCNC: 25.8 PG — LOW (ref 27–34)
MCHC RBC-ENTMCNC: 26.3 PG — LOW (ref 27–34)
MCHC RBC-ENTMCNC: 30.6 % — LOW (ref 32–36)
MCHC RBC-ENTMCNC: 31.3 % — LOW (ref 32–36)
MCV RBC AUTO: 83.9 FL — SIGNIFICANT CHANGE UP (ref 80–100)
MCV RBC AUTO: 84.2 FL — SIGNIFICANT CHANGE UP (ref 80–100)
MONOCYTES # BLD AUTO: 0.66 K/UL — SIGNIFICANT CHANGE UP (ref 0–0.9)
MONOCYTES # BLD AUTO: 0.73 K/UL — SIGNIFICANT CHANGE UP (ref 0–0.9)
MONOCYTES NFR BLD AUTO: 4.4 % — SIGNIFICANT CHANGE UP (ref 2–14)
MONOCYTES NFR BLD AUTO: 6 % — SIGNIFICANT CHANGE UP (ref 2–14)
NEUTROPHILS # BLD AUTO: 13.22 K/UL — HIGH (ref 1.8–7.4)
NEUTROPHILS # BLD AUTO: 9.36 K/UL — HIGH (ref 1.8–7.4)
NEUTROPHILS NFR BLD AUTO: 76.4 % — SIGNIFICANT CHANGE UP (ref 43–77)
NEUTROPHILS NFR BLD AUTO: 87.5 % — HIGH (ref 43–77)
NRBC # FLD: 0 K/UL — SIGNIFICANT CHANGE UP (ref 0–0)
NRBC # FLD: 0 K/UL — SIGNIFICANT CHANGE UP (ref 0–0)
PLATELET # BLD AUTO: 137 K/UL — LOW (ref 150–400)
PLATELET # BLD AUTO: 155 K/UL — SIGNIFICANT CHANGE UP (ref 150–400)
PMV BLD: 11.2 FL — SIGNIFICANT CHANGE UP (ref 7–13)
PMV BLD: 11.7 FL — SIGNIFICANT CHANGE UP (ref 7–13)
RBC # BLD: 4.19 M/UL — SIGNIFICANT CHANGE UP (ref 3.8–5.2)
RBC # BLD: 4.34 M/UL — SIGNIFICANT CHANGE UP (ref 3.8–5.2)
RBC # FLD: 18.7 % — HIGH (ref 10.3–14.5)
RBC # FLD: 18.7 % — HIGH (ref 10.3–14.5)
RH IG SCN BLD-IMP: POSITIVE — SIGNIFICANT CHANGE UP
WBC # BLD: 12.26 K/UL — HIGH (ref 3.8–10.5)
WBC # BLD: 15.11 K/UL — HIGH (ref 3.8–10.5)
WBC # FLD AUTO: 12.26 K/UL — HIGH (ref 3.8–10.5)
WBC # FLD AUTO: 15.11 K/UL — HIGH (ref 3.8–10.5)

## 2019-05-18 RX ORDER — ACETAMINOPHEN 500 MG
975 TABLET ORAL ONCE
Refills: 0 | Status: COMPLETED | OUTPATIENT
Start: 2019-05-18 | End: 2019-05-18

## 2019-05-18 RX ORDER — GENTAMICIN SULFATE 40 MG/ML
VIAL (ML) INJECTION
Refills: 0 | Status: DISCONTINUED | OUTPATIENT
Start: 2019-05-18 | End: 2019-05-18

## 2019-05-18 RX ORDER — HYDROCORTISONE 1 %
1 OINTMENT (GRAM) TOPICAL EVERY 6 HOURS
Refills: 0 | Status: DISCONTINUED | OUTPATIENT
Start: 2019-05-18 | End: 2019-05-20

## 2019-05-18 RX ORDER — SIMETHICONE 80 MG/1
80 TABLET, CHEWABLE ORAL EVERY 4 HOURS
Refills: 0 | Status: DISCONTINUED | OUTPATIENT
Start: 2019-05-18 | End: 2019-05-20

## 2019-05-18 RX ORDER — ACETAMINOPHEN 500 MG
975 TABLET ORAL EVERY 6 HOURS
Refills: 0 | Status: DISCONTINUED | OUTPATIENT
Start: 2019-05-18 | End: 2019-05-20

## 2019-05-18 RX ORDER — OXYCODONE HYDROCHLORIDE 5 MG/1
5 TABLET ORAL
Refills: 0 | Status: DISCONTINUED | OUTPATIENT
Start: 2019-05-18 | End: 2019-05-20

## 2019-05-18 RX ORDER — IBUPROFEN 200 MG
1 TABLET ORAL
Qty: 0 | Refills: 0 | DISCHARGE
Start: 2019-05-18

## 2019-05-18 RX ORDER — OXYTOCIN 10 UNIT/ML
333.33 VIAL (ML) INJECTION
Qty: 20 | Refills: 0 | Status: DISCONTINUED | OUTPATIENT
Start: 2019-05-18 | End: 2019-05-18

## 2019-05-18 RX ORDER — DIPHENHYDRAMINE HCL 50 MG
25 CAPSULE ORAL EVERY 6 HOURS
Refills: 0 | Status: DISCONTINUED | OUTPATIENT
Start: 2019-05-18 | End: 2019-05-20

## 2019-05-18 RX ORDER — OXYTOCIN 10 UNIT/ML
333.33 VIAL (ML) INJECTION
Qty: 20 | Refills: 0 | Status: DISCONTINUED | OUTPATIENT
Start: 2019-05-18 | End: 2019-05-19

## 2019-05-18 RX ORDER — ERTAPENEM SODIUM 1 G/1
1000 INJECTION, POWDER, LYOPHILIZED, FOR SOLUTION INTRAMUSCULAR; INTRAVENOUS EVERY 24 HOURS
Refills: 0 | Status: COMPLETED | OUTPATIENT
Start: 2019-05-18 | End: 2019-05-18

## 2019-05-18 RX ORDER — DOCUSATE SODIUM 100 MG
100 CAPSULE ORAL
Refills: 0 | Status: DISCONTINUED | OUTPATIENT
Start: 2019-05-18 | End: 2019-05-20

## 2019-05-18 RX ORDER — IBUPROFEN 200 MG
600 TABLET ORAL EVERY 6 HOURS
Refills: 0 | Status: COMPLETED | OUTPATIENT
Start: 2019-05-18 | End: 2020-04-15

## 2019-05-18 RX ORDER — ACETAMINOPHEN 500 MG
3 TABLET ORAL
Qty: 0 | Refills: 0 | DISCHARGE
Start: 2019-05-18

## 2019-05-18 RX ORDER — AER TRAVELER 0.5 G/1
1 SOLUTION RECTAL; TOPICAL EVERY 4 HOURS
Refills: 0 | Status: DISCONTINUED | OUTPATIENT
Start: 2019-05-18 | End: 2019-05-20

## 2019-05-18 RX ORDER — DIBUCAINE 1 %
1 OINTMENT (GRAM) RECTAL EVERY 6 HOURS
Refills: 0 | Status: DISCONTINUED | OUTPATIENT
Start: 2019-05-18 | End: 2019-05-20

## 2019-05-18 RX ORDER — OXYCODONE HYDROCHLORIDE 5 MG/1
5 TABLET ORAL ONCE
Refills: 0 | Status: DISCONTINUED | OUTPATIENT
Start: 2019-05-18 | End: 2019-05-20

## 2019-05-18 RX ORDER — SODIUM CHLORIDE 9 MG/ML
1000 INJECTION, SOLUTION INTRAVENOUS
Refills: 0 | Status: DISCONTINUED | OUTPATIENT
Start: 2019-05-18 | End: 2019-05-18

## 2019-05-18 RX ORDER — SODIUM CHLORIDE 9 MG/ML
3 INJECTION INTRAMUSCULAR; INTRAVENOUS; SUBCUTANEOUS EVERY 8 HOURS
Refills: 0 | Status: DISCONTINUED | OUTPATIENT
Start: 2019-05-18 | End: 2019-05-20

## 2019-05-18 RX ORDER — OXYTOCIN 10 UNIT/ML
1 VIAL (ML) INJECTION
Qty: 30 | Refills: 0 | Status: DISCONTINUED | OUTPATIENT
Start: 2019-05-18 | End: 2019-05-18

## 2019-05-18 RX ORDER — GLYCERIN ADULT
1 SUPPOSITORY, RECTAL RECTAL AT BEDTIME
Refills: 0 | Status: DISCONTINUED | OUTPATIENT
Start: 2019-05-18 | End: 2019-05-20

## 2019-05-18 RX ORDER — LEVOTHYROXINE SODIUM 125 MCG
50 TABLET ORAL DAILY
Refills: 0 | Status: DISCONTINUED | OUTPATIENT
Start: 2019-05-18 | End: 2019-05-18

## 2019-05-18 RX ORDER — LANOLIN
1 OINTMENT (GRAM) TOPICAL EVERY 6 HOURS
Refills: 0 | Status: DISCONTINUED | OUTPATIENT
Start: 2019-05-18 | End: 2019-05-20

## 2019-05-18 RX ORDER — PRAMOXINE HYDROCHLORIDE 150 MG/15G
1 AEROSOL, FOAM RECTAL EVERY 4 HOURS
Refills: 0 | Status: DISCONTINUED | OUTPATIENT
Start: 2019-05-18 | End: 2019-05-20

## 2019-05-18 RX ORDER — BENZOCAINE 10 %
1 GEL (GRAM) MUCOUS MEMBRANE EVERY 6 HOURS
Refills: 0 | Status: DISCONTINUED | OUTPATIENT
Start: 2019-05-18 | End: 2019-05-20

## 2019-05-18 RX ORDER — KETOROLAC TROMETHAMINE 30 MG/ML
30 SYRINGE (ML) INJECTION ONCE
Refills: 0 | Status: DISCONTINUED | OUTPATIENT
Start: 2019-05-18 | End: 2019-05-18

## 2019-05-18 RX ORDER — BUTORPHANOL TARTRATE 2 MG/ML
1 INJECTION, SOLUTION INTRAMUSCULAR; INTRAVENOUS ONCE
Refills: 0 | Status: DISCONTINUED | OUTPATIENT
Start: 2019-05-18 | End: 2019-05-18

## 2019-05-18 RX ORDER — MAGNESIUM HYDROXIDE 400 MG/1
30 TABLET, CHEWABLE ORAL
Refills: 0 | Status: DISCONTINUED | OUTPATIENT
Start: 2019-05-18 | End: 2019-05-20

## 2019-05-18 RX ORDER — TETANUS TOXOID, REDUCED DIPHTHERIA TOXOID AND ACELLULAR PERTUSSIS VACCINE, ADSORBED 5; 2.5; 8; 8; 2.5 [IU]/.5ML; [IU]/.5ML; UG/.5ML; UG/.5ML; UG/.5ML
0.5 SUSPENSION INTRAMUSCULAR ONCE
Refills: 0 | Status: DISCONTINUED | OUTPATIENT
Start: 2019-05-18 | End: 2019-05-20

## 2019-05-18 RX ADMIN — Medication 30 MILLIGRAM(S): at 21:35

## 2019-05-18 RX ADMIN — Medication 0.25 MILLIGRAM(S): at 16:05

## 2019-05-18 RX ADMIN — Medication 30 MILLIGRAM(S): at 22:07

## 2019-05-18 RX ADMIN — Medication 0.25 MILLIGRAM(S): at 16:09

## 2019-05-18 RX ADMIN — Medication 0.2 MILLIGRAM(S): at 19:36

## 2019-05-18 RX ADMIN — Medication 1000 MILLIUNIT(S)/MIN: at 22:08

## 2019-05-18 RX ADMIN — SODIUM CHLORIDE 125 MILLILITER(S): 9 INJECTION, SOLUTION INTRAVENOUS at 10:12

## 2019-05-18 RX ADMIN — Medication 1 MILLIUNIT(S)/MIN: at 15:56

## 2019-05-18 RX ADMIN — BUTORPHANOL TARTRATE 1 MILLIGRAM(S): 2 INJECTION, SOLUTION INTRAMUSCULAR; INTRAVENOUS at 13:59

## 2019-05-18 RX ADMIN — BUTORPHANOL TARTRATE 1 MILLIGRAM(S): 2 INJECTION, SOLUTION INTRAMUSCULAR; INTRAVENOUS at 10:20

## 2019-05-18 RX ADMIN — Medication 975 MILLIGRAM(S): at 22:29

## 2019-05-18 RX ADMIN — ERTAPENEM SODIUM 120 MILLIGRAM(S): 1 INJECTION, POWDER, LYOPHILIZED, FOR SOLUTION INTRAMUSCULAR; INTRAVENOUS at 21:30

## 2019-05-18 RX ADMIN — Medication 50 MICROGRAM(S): at 15:53

## 2019-05-18 NOTE — OB PROVIDER H&P - HISTORY OF PRESENT ILLNESS
27yo  @ 38.4 presents with c/o contractions every 5 minutes. Denies LOF, VB and reports GFM.     H/O Hypothyroid- Synthroid 50mcg    OB H/O 10/7/2017 FT  7-12    AP course complicated by 2 vessel cord  GBS negative

## 2019-05-18 NOTE — OB PROVIDER H&P - NSHPPHYSICALEXAM_GEN_ALL_CORE
Assessment reveals VSS, abdomen soft, NT, gravid.   VE 3.5/70/-3  Vtx confirmed by sono  BPP 8/8, BRIAN 6.1. vtx  Cat 1 FHT, ctx q 7    4120: Patient reports increase in pain  VE 4/70/-3  Desires pain management   EFW 7.0 by palp

## 2019-05-18 NOTE — DISCHARGE NOTE OB - CARE PLAN
Principal Discharge DX:	Normal vaginal delivery  Goal:	recovery  Assessment and plan of treatment:	stable

## 2019-05-18 NOTE — OB PROVIDER TRIAGE NOTE - NSHPPHYSICALEXAM_GEN_ALL_CORE
Assessment reveals VSS, abdomen soft, NT, gravid.   VE 3.5/70/-3  Vtx confirmed by sono Assessment reveals VSS, abdomen soft, NT, gravid.   VE 3.5/70/-3  Vtx confirmed by sono  BPP 8/8, BRIAN 6.1. vtx  Cat 1 FHT, ctx q 7    3720: Patient reports increase in pain  VE 4/70/-3  Desires pain management   EFW 7.0 by palp

## 2019-05-18 NOTE — CHART NOTE - NSCHARTNOTEFT_GEN_A_CORE
Att Note:  At approximately 4 pm, pitocin was started. Pt had a tetanic ctx, fetal bradycardia to 70's and 80's ensued which did not resolve with position changes x 3, O@, IVF and pitocin off. Pt received 2 doses of SQ terbutaline prior to transfer. Upon arrival to OR, 's and remained stable at that rate for 20 mins. Pt brought back to LDR. She progressed to 8 cm with AROM-clear fluid during this event. She was observed for further progression off pitocin. Her ctx resumed spontaneously and she requested epidural bolus. Mayte Sauceda MD

## 2019-05-18 NOTE — OB PROVIDER TRIAGE NOTE - HISTORY OF PRESENT ILLNESS
27yo  @ 38.4 presents with c/o contractions every 5 minutes. Denies LOF, VB and reports GFM.     H/O Hypothyroid- Synthroid 50mcg    OB H/O 10/7/2017 FT  7-12    AP course uncomplicated 27yo  @ 38.4 presents with c/o contractions every 5 minutes. Denies LOF, VB and reports GFM.     H/O Hypothyroid- Synthroid 50mcg    OB H/O 10/7/2017 FT  7-12    AP course uncomplicated  GBS negative

## 2019-05-18 NOTE — DISCHARGE NOTE OB - HOSPITAL COURSE
Viable male infant delivered via  @ 38+ weeks. Postpartum course uncomplicated. Given Methergine 0.2 x1 dose at delivery Viable male infant delivered via  @ 38+ weeks. Postpartum course complicated by immediate postpartum fever. Given Methergine 0.2 x1 dose at delivery

## 2019-05-18 NOTE — CHART NOTE - NSCHARTNOTEFT_GEN_A_CORE
Called to bedside by nursing for pt report of left abdominal pain. Pt is POD0 from , upon transfer to wheelchair for move to postpartum floor pt experienced left sided abdominal pain. Pt reports pain is cramping in nature. Pain was relieved after urination, 300cc. Pt able to ambulate to wheelchair. Lochia WNL. No other complaints.    Fundus noted to be deviated to the left. BSS demonstrated distended bladder, uterus with thin endometrial stripe  Pt assisted to restroom, only urinated 100cc  straight cath an additional 150cc  Fundus now midline, at umbilicus  Pt to be moved to postpartum floor    D/w Dr. Alli bowen pgy-1

## 2019-05-18 NOTE — CHART NOTE - NSCHARTNOTEFT_GEN_A_CORE
Patient examined secondary to pressure.   VE: 10/100+2 cat 2 FHT with occasional late decelerations. Moderate variability, overall reassuring fetal status.   Will start pushing, anticipate   Rand Grover MD

## 2019-05-18 NOTE — DISCHARGE NOTE OB - MEDICATION SUMMARY - MEDICATIONS TO TAKE
I will START or STAY ON the medications listed below when I get home from the hospital:    acetaminophen 325 mg oral tablet  -- 3 tab(s) by mouth every 6 hours  -- Indication: For pain    ibuprofen 600 mg oral tablet  -- 1 tab(s) by mouth every 6 hours  -- Indication: For pain I will START or STAY ON the medications listed below when I get home from the hospital:    ibuprofen 600 mg oral tablet  -- 1 tab(s) by mouth every 6 hours, As Needed  -- Indication: For pain    acetaminophen 325 mg oral tablet  -- 3 tab(s) by mouth every 6 hours, As Needed  -- Indication: For pain

## 2019-05-18 NOTE — OB PROVIDER IHI INDUCTION/AUGMENTATION NOTE - NS_CHECKALL_OBGYN_ALL_OB
H&P was completed/Induction / Augmentation was discussed/Contractions pattern was reviewed/Order was written/FHR was reviewed

## 2019-05-18 NOTE — DISCHARGE NOTE OB - PATIENT PORTAL LINK FT
You can access the [facility], offered by [facility], by registering with the following website: [email or web address]

## 2019-05-18 NOTE — DISCHARGE NOTE OB - CARE PROVIDER_API CALL
Mayte Valenzuela (MD)  Obstetrics and Gynecology  [facility] [place] [address]  Phone: [phone]  Fax: [phone]  Follow Up Time:

## 2019-05-18 NOTE — CHART NOTE - NSCHARTNOTEFT_GEN_A_CORE
NP note    Pt seen for examination s/p epidural  Cat I tracing Ctx q5-7min  SVE 4-5/80/-2 bulging membranes ballotable head  For pitocin 1x1 as per Dr. Komal gao, NP

## 2019-05-19 LAB
SPECIMEN SOURCE: SIGNIFICANT CHANGE UP
SPECIMEN SOURCE: SIGNIFICANT CHANGE UP
T PALLIDUM AB TITR SER: NEGATIVE — SIGNIFICANT CHANGE UP

## 2019-05-19 PROCEDURE — 59410 OBSTETRICAL CARE: CPT | Mod: U7,UB,GC

## 2019-05-19 RX ORDER — LEVOTHYROXINE SODIUM 125 MCG
50 TABLET ORAL DAILY
Refills: 0 | Status: DISCONTINUED | OUTPATIENT
Start: 2019-05-19 | End: 2019-05-20

## 2019-05-19 RX ORDER — IBUPROFEN 200 MG
600 TABLET ORAL EVERY 6 HOURS
Refills: 0 | Status: DISCONTINUED | OUTPATIENT
Start: 2019-05-19 | End: 2019-05-20

## 2019-05-19 RX ADMIN — Medication 600 MILLIGRAM(S): at 20:23

## 2019-05-19 RX ADMIN — Medication 600 MILLIGRAM(S): at 13:41

## 2019-05-19 RX ADMIN — Medication 1 TABLET(S): at 09:07

## 2019-05-19 RX ADMIN — Medication 600 MILLIGRAM(S): at 19:24

## 2019-05-19 RX ADMIN — Medication 975 MILLIGRAM(S): at 09:07

## 2019-05-19 RX ADMIN — Medication 975 MILLIGRAM(S): at 22:30

## 2019-05-19 RX ADMIN — SODIUM CHLORIDE 3 MILLILITER(S): 9 INJECTION INTRAMUSCULAR; INTRAVENOUS; SUBCUTANEOUS at 21:08

## 2019-05-19 RX ADMIN — Medication 600 MILLIGRAM(S): at 07:10

## 2019-05-19 RX ADMIN — Medication 600 MILLIGRAM(S): at 07:40

## 2019-05-19 RX ADMIN — SODIUM CHLORIDE 3 MILLILITER(S): 9 INJECTION INTRAMUSCULAR; INTRAVENOUS; SUBCUTANEOUS at 05:13

## 2019-05-19 RX ADMIN — Medication 975 MILLIGRAM(S): at 15:14

## 2019-05-19 RX ADMIN — Medication 975 MILLIGRAM(S): at 15:44

## 2019-05-19 RX ADMIN — Medication 600 MILLIGRAM(S): at 14:11

## 2019-05-19 RX ADMIN — Medication 975 MILLIGRAM(S): at 21:59

## 2019-05-19 RX ADMIN — Medication 975 MILLIGRAM(S): at 09:37

## 2019-05-19 RX ADMIN — SODIUM CHLORIDE 3 MILLILITER(S): 9 INJECTION INTRAMUSCULAR; INTRAVENOUS; SUBCUTANEOUS at 14:00

## 2019-05-19 NOTE — LACTATION INITIAL EVALUATION - LACTATION INTERVENTIONS
assisted with deep latch and positioning  discussed  signs  of  effective  feeding and  swallowing.  discussed  compression at  breast when  nbn  stops  drinking  and  is  still sucking.  instructed  to offer both  breast at a feeding ,feed on cue and safe  skin to skin.  reviewed   strategies  to wake  nbn ,/initiate skin to skin/initiate hand expression routine

## 2019-05-19 NOTE — PROGRESS NOTE ADULT - SUBJECTIVE AND OBJECTIVE BOX
OB Progress Note:  PPD#1    S: 27yo  PPD#1 s/p  c/b PPT of 39.1 on gent/clinda. Patient feels well. Pain is well controlled. She is tolerating a regular diet. She is voiding spontaneously, and ambulating without difficulty. Denies CP/SOB. Denies lightheadedness/dizziness. Denies N/V. Denies subjective fever/chills.    O:  Vitals:  Vital Signs Last 24 Hrs  T(C): 36.9 (19 May 2019 00:05), Max: 39.1 (18 May 2019 20:12)  T(F): 98.4 (19 May 2019 00:05), Max: 102.4 (18 May 2019 20:12)  HR: 95 (19 May 2019 00:19) (63 - 130)  BP: 101/59 (19 May 2019 00:19) (80/48 - 140/66)  BP(mean): --  RR: 18 (19 May 2019 00:05) (16 - 18)  SpO2: 97% (19 May 2019 00:05) (88% - 100%)    MEDICATIONS  (STANDING):  acetaminophen   Tablet .. 975 milliGRAM(s) Oral every 6 hours  diphtheria/tetanus/pertussis (acellular) Vaccine (ADAcel) 0.5 milliLiter(s) IntraMuscular once  ibuprofen  Tablet. 600 milliGRAM(s) Oral every 6 hours  prenatal multivitamin 1 Tablet(s) Oral daily  sodium chloride 0.9% lock flush 3 milliLiter(s) IV Push every 8 hours      Labs:  Blood type: O Positive  Rubella IgG: RPR: Negative                          10.8<L>   15.11<H> >-----------< 137<L>    (  @ 21:05 )             35.3                        11.4<L>   12.26<H> >-----------< 155    (  @ 10:05 )             36.4                        11.8   11.94<H> >-----------< 168    (  @ 18:00 )             38.7                  Physical Exam:  General: NAD  Abdomen: soft, non-tender, non-distended, fundus firm  Extremities: No erythema/edema

## 2019-05-19 NOTE — PROGRESS NOTE ADULT - PROBLEM SELECTOR PLAN 1
- Pain well controlled, continue current pain regimen  - Increase ambulation, SCDs when not ambulating  - Continue regular diet  - Continue gent/clinda until 24 hours afebrile     Efrem Giraldo, PGY-1

## 2019-05-19 NOTE — LACTATION INITIAL EVALUATION - INTERVENTION OUTCOME
nbn demonstrated  deep latch and  performed  with sucking and swallowing  noted  .  nbn  less than 24 hours  . encouraged  more  skin to skin  and  frequent attempts.  rm aware./verbalizes understanding/demonstrates understanding of teaching

## 2019-05-20 VITALS
RESPIRATION RATE: 16 BRPM | SYSTOLIC BLOOD PRESSURE: 130 MMHG | HEART RATE: 93 BPM | OXYGEN SATURATION: 100 % | TEMPERATURE: 98 F | DIASTOLIC BLOOD PRESSURE: 60 MMHG

## 2019-05-20 LAB
BACTERIA UR CULT: SIGNIFICANT CHANGE UP
SPECIMEN SOURCE: SIGNIFICANT CHANGE UP

## 2019-05-20 RX ADMIN — Medication 975 MILLIGRAM(S): at 17:31

## 2019-05-20 RX ADMIN — SODIUM CHLORIDE 3 MILLILITER(S): 9 INJECTION INTRAMUSCULAR; INTRAVENOUS; SUBCUTANEOUS at 05:48

## 2019-05-20 RX ADMIN — Medication 50 MICROGRAM(S): at 06:37

## 2019-05-20 RX ADMIN — Medication 975 MILLIGRAM(S): at 17:50

## 2019-05-20 RX ADMIN — Medication 975 MILLIGRAM(S): at 03:44

## 2019-05-20 RX ADMIN — Medication 975 MILLIGRAM(S): at 03:09

## 2019-05-20 NOTE — PROGRESS NOTE ADULT - SUBJECTIVE AND OBJECTIVE BOX
Subjective  Pain: well controlled  Complaints: none  Milestones: Alert and orientedx3. Out of bed ambulating. Voiding/Due to void. Tolerating a regular diet.  Infant feeding:     breast                            Feeding related issues or concerns:none    Objective   T(C): 36.7 (05-20-19 @ 06:00), Max: 36.9 (05-19-19 @ 17:37)  HR: 93 (05-20-19 @ 06:00) (92 - 93)  BP: 130/60 (05-20-19 @ 06:00) (99/58 - 130/60)  RR: 16 (05-20-19 @ 06:00) (16 - 18)  SpO2: 100% (05-20-19 @ 06:00) (99% - 100%)  Wt(kg): --      Assessment      27 y/o G 2 P 2 .Day # 2 postpartum. s/p PPT s/p Abx, blood and urine culture negative, Patient is afebrile  Past medical history significant for none  Current Issues: none  Breasts:soft  Abdomen: Soft, nontender, nondistended.  Vagina: Lochia moderate  Perineum:  1st degree laceration,   Laceration/episiotomy site:clean  Lower extremities: No edema noted bilaterally of lower extremities. Nontender calves.  Negative Beatris's sign. Positive pedal pulses.  Other relevant physical exam findings;none      Plan  Plan: Increase ambulation, analgesia PRN and pain medication protocal standing oxycodone, ibuprofen and acetaminophen.  Diet: Continue regular diet  Labs:nl  Continue routine postpartum care.   d/c home

## 2019-05-21 RX ORDER — LEVOTHYROXINE SODIUM 125 MCG
1 TABLET ORAL
Qty: 0 | Refills: 0 | DISCHARGE

## 2019-05-23 ENCOUNTER — APPOINTMENT (OUTPATIENT)
Dept: OBGYN | Facility: CLINIC | Age: 29
End: 2019-05-23

## 2019-05-23 LAB
BACTERIA BLD CULT: SIGNIFICANT CHANGE UP
BACTERIA BLD CULT: SIGNIFICANT CHANGE UP

## 2019-05-30 ENCOUNTER — APPOINTMENT (OUTPATIENT)
Dept: OBGYN | Facility: CLINIC | Age: 29
End: 2019-05-30
Payer: COMMERCIAL

## 2019-05-30 ENCOUNTER — APPOINTMENT (OUTPATIENT)
Dept: OBGYN | Facility: CLINIC | Age: 29
End: 2019-05-30

## 2019-05-30 VITALS
WEIGHT: 137.44 LBS | BODY MASS INDEX: 25.29 KG/M2 | SYSTOLIC BLOOD PRESSURE: 112 MMHG | HEIGHT: 62 IN | HEART RATE: 87 BPM | DIASTOLIC BLOOD PRESSURE: 77 MMHG

## 2019-05-30 VITALS — HEIGHT: 62 IN

## 2019-05-30 PROBLEM — E03.9 HYPOTHYROIDISM, UNSPECIFIED: Chronic | Status: ACTIVE | Noted: 2019-05-16

## 2019-05-30 PROCEDURE — 0503F POSTPARTUM CARE VISIT: CPT

## 2019-05-31 NOTE — HISTORY OF PRESENT ILLNESS
[Postpartum Follow Up] : postpartum follow up [Complications:___] : no complications [Delivery Date: ___] : on [unfilled] [] : delivered by vaginal delivery [Breastfeeding] : currently nursing [Abdominal Pain] : abdominal pain [Back Pain] : back pain [Dysuria] : dysuria [___ wks] : is [unfilled] weeks in size [Soft] : soft [Tender] : non tender [Distended] : not distended [Fair Pain Control] : has fair pain control [FreeTextEntry8] : Back pain and lower abdominal pain, especially when standing up >20-30 mins at a time. Also some pain with urination.  [de-identified] : Relief of symptoms with Tylenol, takes 500 mg 3x daily.  [de-identified] : Discussed suspect all symptoms are musculoskeletal.  [de-identified] : Patient instructed to rest, continue Tylenol as she is doing. Urine culture sent. F/u 4 weeks for full postpartum visit.

## 2019-06-10 LAB — BACTERIA UR CULT: NORMAL

## 2019-06-26 ENCOUNTER — APPOINTMENT (OUTPATIENT)
Dept: OBGYN | Facility: CLINIC | Age: 29
End: 2019-06-26
Payer: COMMERCIAL

## 2019-06-26 VITALS
HEIGHT: 62 IN | SYSTOLIC BLOOD PRESSURE: 119 MMHG | HEART RATE: 88 BPM | WEIGHT: 133.06 LBS | BODY MASS INDEX: 24.48 KG/M2 | DIASTOLIC BLOOD PRESSURE: 62 MMHG

## 2019-06-26 PROCEDURE — 0503F POSTPARTUM CARE VISIT: CPT

## 2019-06-26 NOTE — HISTORY OF PRESENT ILLNESS
[Postpartum Follow Up] : postpartum follow up [Delivery Date: ___] : on [unfilled] [Male] : Delivery History: baby boy [Pertussis Vaccine] : Pertussis vaccine administered [Boy] : baby is a boy [Infant's Name ___] : [unfilled] [___ Lbs] : [unfilled] lbs [___ Oz] : [unfilled] oz [Not Circumcised] : not circumcised [Living at Home] : is currently living at home [Bottle Feeding] : bottle feeding [Breastfeeding] : currently nursing [Intended Contraception] : Intended Contraception: [Rubella Vaccine] : Rubella vaccine was not administered [Complications:___] : no complications [Rhogam] : Rhogam was not administered [BTL] : no tubal ligation [BF with Difficulty] : nursing without difficulty [Resumed Menses] : has not resumed her menses [Resumed [place]] : has not resumed intercourse [S/Sx PP Depression] : no signs/symptoms of postpartum depression [Back to Normal] : is back to normal in size [None] : no vaginal bleeding [Normal] : the vagina was normal [Cervix Sample Taken] : cervical sample not taken for a Pap smear [Healing Well] : is healing well [Examination Of The Breasts] : breasts are normal [de-identified] : formula [de-identified] : 29 y/o P2 here for postpartum visit, doing well. Declined contraception at this time [de-identified] : Routine postpartum care, discussed contraception, will return if she decides for progesterone options. Urine culture sent for increased pressure with urination

## 2019-10-24 NOTE — ED PROVIDER NOTE - DISPOSITION TYPE
[place] show done and video watched Infant has roomed in with mother this shift except for maternal exhaustion. Benefits of rooming in discussed. Irene LEE in room to [employer] of care, pt voiced understanding.  Timeout complete Post birth warning signs education paper given and reviewed, teaching complete. [place] postpartum depression screening discussed with patient, instructed to contact her healthcare provider if her score is > 10. Patient voiced understanding. Mother's blood type is O+. Baby's blood type is A+. Rhogam not indicated. Pt refused all vaccines. Reviewed post birth warning signs paper with patient. Patient voiced understanding. Voided large amount, small amount of lochia with no clots. Perineal care done per patient. Fundus even at umbilicus, center and firm.  IV removed with tip intact DISCHARGE no

## 2020-02-12 ENCOUNTER — APPOINTMENT (OUTPATIENT)
Dept: RADIOLOGY | Facility: IMAGING CENTER | Age: 30
End: 2020-02-12
Payer: MEDICAID

## 2020-02-12 ENCOUNTER — OUTPATIENT (OUTPATIENT)
Dept: OUTPATIENT SERVICES | Facility: HOSPITAL | Age: 30
LOS: 1 days | End: 2020-02-12
Payer: MEDICAID

## 2020-02-12 DIAGNOSIS — Z00.8 ENCOUNTER FOR OTHER GENERAL EXAMINATION: ICD-10-CM

## 2020-02-12 PROCEDURE — 71046 X-RAY EXAM CHEST 2 VIEWS: CPT

## 2020-02-12 PROCEDURE — 71046 X-RAY EXAM CHEST 2 VIEWS: CPT | Mod: 26

## 2020-04-13 LAB — BACTERIA UR CULT: NORMAL

## 2020-10-08 ENCOUNTER — APPOINTMENT (OUTPATIENT)
Dept: OBGYN | Facility: CLINIC | Age: 30
End: 2020-10-08
Payer: MEDICAID

## 2020-10-08 VITALS
BODY MASS INDEX: 23.92 KG/M2 | DIASTOLIC BLOOD PRESSURE: 72 MMHG | HEIGHT: 62 IN | HEART RATE: 75 BPM | SYSTOLIC BLOOD PRESSURE: 116 MMHG | TEMPERATURE: 98.3 F | WEIGHT: 130 LBS

## 2020-10-08 DIAGNOSIS — Z87.898 PERSONAL HISTORY OF OTHER SPECIFIED CONDITIONS: ICD-10-CM

## 2020-10-08 DIAGNOSIS — R52 OTHER COMPLICATIONS OF THE PUERPERIUM, NOT ELSEWHERE CLASSIFIED: ICD-10-CM

## 2020-10-08 DIAGNOSIS — R32 UNSPECIFIED URINARY INCONTINENCE: ICD-10-CM

## 2020-10-08 DIAGNOSIS — Q27.0 CONGENITAL ABSENCE AND HYPOPLASIA OF UMBILICAL ARTERY: ICD-10-CM

## 2020-10-08 PROCEDURE — 99213 OFFICE O/P EST LOW 20 MIN: CPT

## 2020-10-08 RX ORDER — VITAMIN A, VITAMIN C, VITAMIN D-3, VITAMIN E, VITAMIN B-1, VITAMIN B-2, NIACIN, VITAMIN B-6, CALCIUM, IRON, ZINC, COPPER 4000; 120; 400; 22; 1.84; 3; 20; 10; 1; 12; 200; 27; 25; 2 [IU]/1; MG/1; [IU]/1; MG/1; MG/1; MG/1; MG/1; MG/1; MG/1; UG/1; MG/1; MG/1; MG/1; MG/1
27-1 TABLET ORAL
Qty: 90 | Refills: 3 | Status: DISCONTINUED | COMMUNITY
Start: 2019-04-04 | End: 2020-10-08

## 2021-01-13 ENCOUNTER — APPOINTMENT (OUTPATIENT)
Dept: OBGYN | Facility: CLINIC | Age: 31
End: 2021-01-13

## 2021-02-01 ENCOUNTER — APPOINTMENT (OUTPATIENT)
Dept: OBGYN | Facility: CLINIC | Age: 31
End: 2021-02-01

## 2021-02-04 ENCOUNTER — APPOINTMENT (OUTPATIENT)
Dept: OBGYN | Facility: CLINIC | Age: 31
End: 2021-02-04

## 2021-04-04 ENCOUNTER — TRANSCRIPTION ENCOUNTER (OUTPATIENT)
Age: 31
End: 2021-04-04

## 2021-04-04 ENCOUNTER — RESULT REVIEW (OUTPATIENT)
Age: 31
End: 2021-04-04

## 2021-04-04 ENCOUNTER — INPATIENT (INPATIENT)
Facility: HOSPITAL | Age: 31
LOS: 0 days | Discharge: HOME | End: 2021-04-05
Attending: STUDENT IN AN ORGANIZED HEALTH CARE EDUCATION/TRAINING PROGRAM | Admitting: STUDENT IN AN ORGANIZED HEALTH CARE EDUCATION/TRAINING PROGRAM
Payer: MEDICAID

## 2021-04-04 VITALS
HEIGHT: 62 IN | RESPIRATION RATE: 16 BRPM | DIASTOLIC BLOOD PRESSURE: 68 MMHG | HEART RATE: 83 BPM | TEMPERATURE: 98 F | SYSTOLIC BLOOD PRESSURE: 109 MMHG | OXYGEN SATURATION: 100 %

## 2021-04-04 DIAGNOSIS — O03.4 INCOMPLETE SPONTANEOUS ABORTION WITHOUT COMPLICATION: ICD-10-CM

## 2021-04-04 LAB
ALBUMIN SERPL ELPH-MCNC: 4.3 G/DL — SIGNIFICANT CHANGE UP (ref 3.3–5)
ALP SERPL-CCNC: 79 U/L — SIGNIFICANT CHANGE UP (ref 40–120)
ALT FLD-CCNC: 7 U/L — SIGNIFICANT CHANGE UP (ref 4–33)
ANION GAP SERPL CALC-SCNC: 9 MMOL/L — SIGNIFICANT CHANGE UP (ref 7–14)
AST SERPL-CCNC: 10 U/L — SIGNIFICANT CHANGE UP (ref 4–32)
BASOPHILS # BLD AUTO: 0.02 K/UL — SIGNIFICANT CHANGE UP (ref 0–0.2)
BASOPHILS NFR BLD AUTO: 0.2 % — SIGNIFICANT CHANGE UP (ref 0–2)
BILIRUB SERPL-MCNC: 0.7 MG/DL — SIGNIFICANT CHANGE UP (ref 0.2–1.2)
BLD GP AB SCN SERPL QL: NEGATIVE — SIGNIFICANT CHANGE UP
BUN SERPL-MCNC: 10 MG/DL — SIGNIFICANT CHANGE UP (ref 7–23)
CALCIUM SERPL-MCNC: 9 MG/DL — SIGNIFICANT CHANGE UP (ref 8.4–10.5)
CHLORIDE SERPL-SCNC: 105 MMOL/L — SIGNIFICANT CHANGE UP (ref 98–107)
CO2 SERPL-SCNC: 25 MMOL/L — SIGNIFICANT CHANGE UP (ref 22–31)
CREAT SERPL-MCNC: 0.67 MG/DL — SIGNIFICANT CHANGE UP (ref 0.5–1.3)
EOSINOPHIL # BLD AUTO: 0.09 K/UL — SIGNIFICANT CHANGE UP (ref 0–0.5)
EOSINOPHIL NFR BLD AUTO: 1.1 % — SIGNIFICANT CHANGE UP (ref 0–6)
GLUCOSE SERPL-MCNC: 76 MG/DL — SIGNIFICANT CHANGE UP (ref 70–99)
HCG SERPL-ACNC: 25.7 MIU/ML — SIGNIFICANT CHANGE UP
HCT VFR BLD CALC: 37.1 % — SIGNIFICANT CHANGE UP (ref 34.5–45)
HGB BLD-MCNC: 11.7 G/DL — SIGNIFICANT CHANGE UP (ref 11.5–15.5)
IANC: 5.84 K/UL — SIGNIFICANT CHANGE UP (ref 1.5–8.5)
IMM GRANULOCYTES NFR BLD AUTO: 0.5 % — SIGNIFICANT CHANGE UP (ref 0–1.5)
LYMPHOCYTES # BLD AUTO: 1.67 K/UL — SIGNIFICANT CHANGE UP (ref 1–3.3)
LYMPHOCYTES # BLD AUTO: 20.6 % — SIGNIFICANT CHANGE UP (ref 13–44)
MCHC RBC-ENTMCNC: 27.7 PG — SIGNIFICANT CHANGE UP (ref 27–34)
MCHC RBC-ENTMCNC: 31.5 GM/DL — LOW (ref 32–36)
MCV RBC AUTO: 87.9 FL — SIGNIFICANT CHANGE UP (ref 80–100)
MONOCYTES # BLD AUTO: 0.46 K/UL — SIGNIFICANT CHANGE UP (ref 0–0.9)
MONOCYTES NFR BLD AUTO: 5.7 % — SIGNIFICANT CHANGE UP (ref 2–14)
NEUTROPHILS # BLD AUTO: 5.84 K/UL — SIGNIFICANT CHANGE UP (ref 1.8–7.4)
NEUTROPHILS NFR BLD AUTO: 71.9 % — SIGNIFICANT CHANGE UP (ref 43–77)
NRBC # BLD: 0 /100 WBCS — SIGNIFICANT CHANGE UP
NRBC # FLD: 0 K/UL — SIGNIFICANT CHANGE UP
PLATELET # BLD AUTO: 204 K/UL — SIGNIFICANT CHANGE UP (ref 150–400)
POTASSIUM SERPL-MCNC: 4.2 MMOL/L — SIGNIFICANT CHANGE UP (ref 3.5–5.3)
POTASSIUM SERPL-SCNC: 4.2 MMOL/L — SIGNIFICANT CHANGE UP (ref 3.5–5.3)
PROT SERPL-MCNC: 7.2 G/DL — SIGNIFICANT CHANGE UP (ref 6–8.3)
RBC # BLD: 4.22 M/UL — SIGNIFICANT CHANGE UP (ref 3.8–5.2)
RBC # FLD: 12.8 % — SIGNIFICANT CHANGE UP (ref 10.3–14.5)
RH IG SCN BLD-IMP: POSITIVE — SIGNIFICANT CHANGE UP
SARS-COV-2 RNA SPEC QL NAA+PROBE: SIGNIFICANT CHANGE UP
SODIUM SERPL-SCNC: 139 MMOL/L — SIGNIFICANT CHANGE UP (ref 135–145)
WBC # BLD: 8.12 K/UL — SIGNIFICANT CHANGE UP (ref 3.8–10.5)
WBC # FLD AUTO: 8.12 K/UL — SIGNIFICANT CHANGE UP (ref 3.8–10.5)

## 2021-04-04 PROCEDURE — 99285 EMERGENCY DEPT VISIT HI MDM: CPT

## 2021-04-04 PROCEDURE — 76830 TRANSVAGINAL US NON-OB: CPT | Mod: 26

## 2021-04-04 RX ORDER — ONDANSETRON 8 MG/1
4 TABLET, FILM COATED ORAL ONCE
Refills: 0 | Status: DISCONTINUED | OUTPATIENT
Start: 2021-04-05 | End: 2021-04-05

## 2021-04-04 RX ORDER — SODIUM CHLORIDE 9 MG/ML
1000 INJECTION INTRAMUSCULAR; INTRAVENOUS; SUBCUTANEOUS ONCE
Refills: 0 | Status: COMPLETED | OUTPATIENT
Start: 2021-04-04 | End: 2021-04-04

## 2021-04-04 RX ORDER — HYDROMORPHONE HYDROCHLORIDE 2 MG/ML
0.5 INJECTION INTRAMUSCULAR; INTRAVENOUS; SUBCUTANEOUS
Refills: 0 | Status: DISCONTINUED | OUTPATIENT
Start: 2021-04-05 | End: 2021-04-05

## 2021-04-04 RX ADMIN — SODIUM CHLORIDE 1000 MILLILITER(S): 9 INJECTION INTRAMUSCULAR; INTRAVENOUS; SUBCUTANEOUS at 17:13

## 2021-04-04 NOTE — ED ADULT TRIAGE NOTE - HEIGHT IN FEET
Gunjan Bates is a 4 year old female who presents for her well child evaluation. [place] by mother and sister.  HPI (per nursing notes) reviewed, confirmed with patient/parent.    Concerns: see RN note  1. History of asthma, on singulair as controller. She uses albuterol prn URI but has done well this winter      There has been no significant reaction with past immunizations. In the household no one is ill. In the household no one is on immunosuppressive drugs (e.g., prednisone); no one has cancer; and no one has an immunodeficiency condition including HIV/AIDS.    REVIEW OF SYSTEMS see HPI; otherwise denies HEENT, NECK, RESP, CARDIAC, GI, , NEURO or PSYCH Sx.    Past Medical History:   Diagnosis Date   • Allergic rhinitis due to allergen 6/1/2015   • Asthma 06/01/2015    On singulair as controller   • Healthy infant or child        Family History   Problem Relation Age of Onset   • NEGATIVE FAMILY HX OF Mother    • Allergies Mother      Seasonal   • NEGATIVE FAMILY HX OF Father      Past Surgical History:   Procedure Laterality Date   • PAST SURGICAL HISTORY      PE tubes placed      SOCIAL HISTORY: lives with mom, dad, sister Bridgett.   Social History   Substance Use Topics   • Smoking status: Never Smoker   • Smokeless tobacco: Never Used   • Alcohol use Not on file     Current Outpatient Prescriptions   Medication Sig Dispense Refill   • montelukast (SINGULAIR) 4 MG chewable tablet CHEW AND SWALLOW ONE TABLET NIGHTLY 30 tablet 11   • Pediatric Multivit-Minerals-C (CHILDRENS MULTIVITAMIN PO)      • Cetirizine HCl (ZYRTEC CHILDRENS ALLERGY PO) Take 5 mg by mouth.     • albuterol 108 (90 BASE) MCG/ACT inhaler Inhale 2 puffs into the lungs every 4 hours as needed for Wheezing or Other (cough). 1 Inhaler 2   • Spacer/Aero-Holding Chambers (AEROCHAMBER) Dispense with mask sized to patient 1 Device 0     No current facility-administered medications for this visit.      ALLERGIES:  No Known Allergies     PHYSICAL  5 EXAM  GENERAL: Gunjan Bates is an alert, vigorous female with appropriate behavior. She is in no acute distress.  SKIN: Skin color, texture, turgor are normal. There are no bruises, rashes or lesions. There are no signs of injury.  HEAD: The head is atraumatic and normocephalic.  EYES: The eyelids are normal. Both eyes open. The conjunctivae appear normal.  The corneae are clear. There is no fixed deviation of gaze. Extraocular muscle movements are intact. Red reflexes are seen bilaterally; no dark spots are visualized.  EARS: Gunjan Bates responds to the spoken word. Exam of the ears reveals the pinnae to be normal. The external auditory canals are clear and the tympanic membranes are normal. PE tubes in place bilaterally  NOSE: There is no nasal flaring.  THROAT: The oropharynx is normal.  TEETH: The teeth are normal.  NECK: The neck is normal. The thyroid is not palpably enlarged.  LYMPH NODES: There are no palpably enlarged lymph nodes in the neck or groin.  TRUNK AND THORAX: There are no lesions on the trunk. The thorax is symmetrical and longer in the transverse than in the AP diameter. There are no retractions.  LUNGS: The lung fields are clear to auscultation.  HEART: The precordium is quiet. The heart rhythm is grossly regular. S1 and S2 are normal. The heart tones are strong. There are no murmurs. The femoral pulses are normal.  ABDOMEN: There is not an umbilical hernia. The abdomen is flat, soft, and not tender. There are no masses. Neither the liver nor the spleen is enlarged. The bowel sounds are normal.  BACK: The back is normal.  GENITALIA: Gunjna has normal, Anselmo stage I, female genitalia with 30% adhesions of the labia minora. There is not inflammation of the medial surfaces of the labia majora.  No inguinal herniae are present.  EXTREMITIES: The extremities are normal. There is no clubbing. There is no edema.  NEUROLOGIC: Her displays normal tone, sensation, and strength throughout. There are no  focal deficits.      ASSESSMENT: Well 4 year old female child      Increasing weight trend      Asthma, mild-moderate persistent, under good control      History of dysuria, with UCx x 2 negative     Labial adhesions    GUIDANCE:      School readiness - DISCUSSED      Speech articulation - DISCUSSED      Memorize name, address and phone - DISCUSSED      Dental care - DISCUSSED      Exercise - DISCUSSED    PLAN:      Monitor CHO based snacks. Continue healthy eating and regular exercise      Continue singulair daily. Albuterol prn illness      UA pending      Immunizations: DTaP/IPV today. MD discussed risks, benefits, side effect profile of vaccines in detail with parent.       Plan per orders.      Return for next well child exam in 1 year.

## 2021-04-04 NOTE — ED ADULT NURSE REASSESSMENT NOTE - NS ED NURSE REASSESS COMMENT FT1
Pt A&OX4, appears comfortable. RR even and unlabored. Pt has no physical complaints at this time. IV established with 20G in LAC. Labs drawn and sent. Covid swab sent. Fluids running as per eMAR. Pt admitted to surgery. Will continue to monitor.

## 2021-04-04 NOTE — CONSULT NOTE ADULT - SUBJECTIVE AND OBJECTIVE BOX
BELGICA HARTLEY  30y  Female [id number]    HPI:29 yo  LMP unknown s/p a medical   for undesired pregnancy at [facility] presenting w/ new onset heavy vaginal bleeding starting this am.  Patient had light bleeding x2 days approx. 2 weeks ago which she thought was her menses returning following the med ab.  Last night she had spotting following by heavy bleeding using 6 saturated pads prior to encounter in ED.  Bleeding accompanied w/ mild pain but overall patient comfortable.  Denies nausea, vomiting, chest pain, SOB, fever.    Name of GYN Physician: Dr. Valenzuela    POB:  NSVDx2, Med Ab x1    Pgyn: Denies fibroids, cysts, endometriosis, STI's, Abnormal pap smears     Home meds: synthroid 50    Allergies    No Known Allergies    PAST MEDICAL & SURGICAL HISTORY:  Hypothyroidism  dx 2017    Normal vaginal delivery    7-12    No significant past surgical history    FAMILY HISTORY:  No pertinent family history in first degree relatives    Social History:  Denies smoking use, drug use, alcohol use.       Vital Signs Last 24 Hrs  T(C): 36.6 (2021 11:15), Max: 36.8 (2021 09:04)  T(F): 97.8 (2021 11:15), Max: 98.3 (2021 09:04)  HR: 64 (2021 11:15) (64 - 83)  BP: 106/66 (2021 11:15) (106/66 - 109/68)  BP(mean): --  RR: 16 (2021 11:15) (16 - 16)  SpO2: 100% (2021 11:15) (100% - 100%)    Physical Exam:   General: sitting comfortably in bed, NAD   HEENT: neck supple, full ROM  CV: RR S1S2 no m/r/g  Lungs: CTA b/l, good air flow b/l   Abd: Soft, non-tender, non-distended.  Bowel sounds present.    :  light bleeding on pad.  External labia wnl.  Bimanual exam with no cervical motion tenderness, uterus wnl, adnexa non palpable b/l.  Cervix dilated 0.5-1 cm  Speculum Exam: slow trickle bleeding from os, no active pooling.   Ext: non-tender b/l, no edema     LABS:                          11.7   8.12  )-----------( 204      ( 2021 10:11 )             37.1     I&O's Detail    RADIOLOGY & ADDITIONAL STUDIES:  < from: US Transvaginal (21 @ 10:56) >    EXAM:  US TRANSVAGINAL      PROCEDURE DATE:  2021     INTERPRETATION:  CLINICAL INFORMATION: Status post medical . Heavy vaginal bleeding.    LMP: Unknown    COMPARISON: Pelvic sonogram 3/15/2017    TECHNIQUE:  Endovaginal pelvic sonogram only. Color and Spectral Doppler was performed.    FINDINGS:    Uterus: 7.6 cm x 4.8 cm x 5.4 cm.  Endometrium: Focal heterogeneous thickening of the fundal endometrium with fluid and solid elements measuring 22 mm. There is associated vascularity that may indicate retained products of conception.    Right ovary: 2.1 cm x 1.2 cm x 1.0 cm. Within normal limits. Normal arterial and venous waveforms.  Left ovary: 2.5 cm x 2.1 cm x 2.1 cm. A dominant follicle measures 1.6 cm. Normal arterial and venous waveforms.    Fluid: None.    IMPRESSION:    Complex, heterogeneous tissue in the fundal endometrium with internal vascularity suggesting hematoma and possibly retained products of conception.    JIA MENEZES MD; Resident Radiology  This document has been electronically signed.  LUBA VALERA MD; Attending Radiologist  This document has been electronically signed. 2021 11:34AM

## 2021-04-04 NOTE — ED PROVIDER NOTE - CLINICAL SUMMARY MEDICAL DECISION MAKING FREE TEXT BOX
This is likely a regular menstruation but will check h/h since the pt mentioned one episode of dizziness and will also perform a transvaginal to ensure no retained products although unlikely since this did occur three months ago.

## 2021-04-04 NOTE — ED ADULT TRIAGE NOTE - CHIEF COMPLAINT QUOTE
Pt BIBA from home, c/o headache, lower abd pain and vaginal bleeding. Reports light bleeding yesterday and heavier today. +nausea, denies vomiting. LMP 1 month ago, states she is due for her menstrual period. PMH of hypothyroidism.

## 2021-04-04 NOTE — H&P ADULT - NSHPLABSRESULTS_GEN_ALL_CORE
Vital Signs Last 24 Hrs  T(C): 36.6 (2021 11:15), Max: 36.8 (2021 09:04)  T(F): 97.8 (2021 11:15), Max: 98.3 (2021 09:04)  HR: 64 (2021 11:15) (64 - 83)  BP: 106/66 (2021 11:15) (106/66 - 109/68)  BP(mean): --  RR: 16 (2021 11:15) (16 - 16)  SpO2: 100% (2021 11:15) (100% - 100%)    Physical Exam:   General: sitting comfortably in bed, NAD   HEENT: neck supple, full ROM  CV: RR S1S2 no m/r/g  Lungs: CTA b/l, good air flow b/l   Abd: Soft, non-tender, non-distended.  Bowel sounds present.    :  light bleeding on pad.  External labia wnl.  Bimanual exam with no cervical motion tenderness, uterus wnl, adnexa non palpable b/l.  Cervix dilated 0.5-1 cm  Speculum Exam: slow trickle bleeding from os, no active pooling.   Ext: non-tender b/l, no edema     LABS:                          11.7   8.12  )-----------( 204      ( 2021 10:11 )             37.1     I&O's Detail    RADIOLOGY & ADDITIONAL STUDIES:  < from: US Transvaginal (21 @ 10:56) >    EXAM:  US TRANSVAGINAL      PROCEDURE DATE:  2021     INTERPRETATION:  CLINICAL INFORMATION: Status post medical . Heavy vaginal bleeding.    LMP: Unknown    COMPARISON: Pelvic sonogram 3/15/2017    TECHNIQUE:  Endovaginal pelvic sonogram only. Color and Spectral Doppler was performed.    FINDINGS:    Uterus: 7.6 cm x 4.8 cm x 5.4 cm.  Endometrium: Focal heterogeneous thickening of the fundal endometrium with fluid and solid elements measuring 22 mm. There is associated vascularity that may indicate retained products of conception.    Right ovary: 2.1 cm x 1.2 cm x 1.0 cm. Within normal limits. Normal arterial and venous waveforms.  Left ovary: 2.5 cm x 2.1 cm x 2.1 cm. A dominant follicle measures 1.6 cm. Normal arterial and venous waveforms.    Fluid: None.    IMPRESSION:    Complex, heterogeneous tissue in the fundal endometrium with internal vascularity suggesting hematoma and possibly retained products of conception.    JIA MENEZES MD; Resident Radiology  This document has been electronically signed.  LUBA VALERA MD; Attending Radiologist  This document has been electronically signed. 2021 11:34AM

## 2021-04-04 NOTE — ED ADULT NURSE NOTE - OBJECTIVE STATEMENT
Received pt to intake bed 7, A+Ox4, ambulatory. C/O Vaginal bleeding starting last night, worsening, 2 pads filled per hour this morning. pt states she syncopized denies head trauma, unsure of how long LOC. Respirations even and unlabored, normal work of breathing, no accessory muscle use, speaking in full clear uninterrupted sentences. ABD is soft, non tender, non distended. Pt denies any chest pain, SOB, N/V/D, fever, chills. Labs sent, Medicated as per MD, will continue to monitor.

## 2021-04-04 NOTE — H&P ADULT - NSHPPHYSICALEXAM_GEN_ALL_CORE
Vital Signs Last 24 Hrs  T(C): 36.6 (04 Apr 2021 11:15), Max: 36.8 (04 Apr 2021 09:04)  T(F): 97.8 (04 Apr 2021 11:15), Max: 98.3 (04 Apr 2021 09:04)  HR: 64 (04 Apr 2021 11:15) (64 - 83)  BP: 106/66 (04 Apr 2021 11:15) (106/66 - 109/68)  BP(mean): --  RR: 16 (04 Apr 2021 11:15) (16 - 16)  SpO2: 100% (04 Apr 2021 11:15) (100% - 100%)    Physical Exam:   General: sitting comfortably in bed, NAD   HEENT: neck supple, full ROM  CV: RR S1S2 no m/r/g  Lungs: CTA b/l, good air flow b/l   Abd: Soft, non-tender, non-distended.  Bowel sounds present.    :  light bleeding on pad.  External labia wnl.  Bimanual exam with no cervical motion tenderness, uterus wnl, adnexa non palpable b/l.  Cervix dilated 0.5-1 cm  Speculum Exam: slow trickle bleeding from os, no active pooling.   Ext: non-tender b/l, no edema

## 2021-04-04 NOTE — ED PROVIDER NOTE - OBJECTIVE STATEMENT
31 yo F with no PMHx here with vaginal bleeding Pt BIBA from home, c/o headache, lower abd pain and vaginal bleeding. Reports light bleeding yesterday and heavier today. +nausea, denies vomiting. LMP 1 month ago, states she is due for her menstrual period. PMH of hypothyroidism.  vaginal bleeding 31 yo F with Hx hypothyroidism here with vaginal bleeding and lower abdominal cramping that began at 8pm last night and increased this morning. The pt says she was given 'pills for an  three months ago.' She has been having regular periods since then and a lighter on last month around 3/15. +nausea and slight dizziness which have since resolved. Denies f/c, palpitations, sob, cp, vomiting, weakness.

## 2021-04-04 NOTE — ED ADULT NURSE REASSESSMENT NOTE - NS ED NURSE REASSESS COMMENT FT1
Report given to OR COLT [place]. Patient appears comfortable denies any complaints at this time. Respirations appear unlabored. Awaiting transport for OR.

## 2021-04-04 NOTE — H&P ADULT - ASSESSMENT
29 yo  LMP unknown s/p a medical   for undesired pregnancy at [facility] presenting w/ new onset heavy vaginal bleeding starting this am.  Ultrasound significant for retained POC.    -add on to OR for D&C  -risks/benefits/alternatives discussed  -cbc, cmp, T&S, covid pending    d/w Dr. Bronson 29 yo  LMP unknown s/p a medical   for undesired pregnancy at [facility] presenting w/ new onset heavy vaginal bleeding starting this am.  Ultrasound significant for retained POC.  Patient to be added on to OR emergently for D&C for retained POC requiring surgical management.    -add on to OR for D&C  -risks/benefits/alternatives discussed  -NPO since this AM  -LR@125  -cbc, cmp, T&S, covid pending  -O+, rhogam not indicated    d/w Dr. Aiyana Maldonado PGY2

## 2021-04-04 NOTE — H&P ADULT - HISTORY OF PRESENT ILLNESS
BELGICA HARTLEY  30y  Female [id number]    HPI:29 yo  LMP unknown s/p a medical   for undesired pregnancy at [facility] presenting w/ new onset heavy vaginal bleeding starting this am.  Patient had light bleeding x2 days approx. 2 weeks ago which she thought was her menses returning following the med ab.  Last night she had spotting following by heavy bleeding using 6 saturated pads prior to encounter in ED.  Bleeding accompanied w/ mild pain but overall patient comfortable.  Denies nausea, vomiting, chest pain, SOB, fever.    Name of GYN Physician: Dr. Valenzuela    POB:  NSVDx2, Med Ab x1    Pgyn: Denies fibroids, cysts, endometriosis, STI's, Abnormal pap smears     Home meds: synthroid 50    Allergies    No Known Allergies    PAST MEDICAL & SURGICAL HISTORY:  Hypothyroidism  dx 2017    Normal vaginal delivery  2017  7-12    No significant past surgical history    FAMILY HISTORY:  No pertinent family history in first degree relatives    Social History:  Denies smoking use, drug use, alcohol use.

## 2021-04-04 NOTE — ED ADULT NURSE REASSESSMENT NOTE - NS ED NURSE REASSESS COMMENT FT1
Valuables sent to security, jewelry given to -Hung, as per OR nurse Dariana send pts clothing, clothing on stretcher.

## 2021-04-05 VITALS — OXYGEN SATURATION: 100 % | HEART RATE: 72 BPM | RESPIRATION RATE: 16 BRPM | TEMPERATURE: 98 F

## 2021-04-05 PROCEDURE — 88305 TISSUE EXAM BY PATHOLOGIST: CPT | Mod: 26

## 2021-04-05 PROCEDURE — 59812 TREATMENT OF MISCARRIAGE: CPT | Mod: GC

## 2021-04-05 RX ORDER — SODIUM CHLORIDE 9 MG/ML
1000 INJECTION, SOLUTION INTRAVENOUS
Refills: 0 | Status: DISCONTINUED | OUTPATIENT
Start: 2021-04-05 | End: 2021-04-05

## 2021-04-05 RX ORDER — OXYCODONE HYDROCHLORIDE 5 MG/1
5 TABLET ORAL ONCE
Refills: 0 | Status: DISCONTINUED | OUTPATIENT
Start: 2021-04-05 | End: 2021-04-05

## 2021-04-05 RX ORDER — ONDANSETRON 8 MG/1
4 TABLET, FILM COATED ORAL ONCE
Refills: 0 | Status: DISCONTINUED | OUTPATIENT
Start: 2021-04-05 | End: 2021-04-05

## 2021-04-05 RX ORDER — ACETAMINOPHEN 500 MG
1000 TABLET ORAL ONCE
Refills: 0 | Status: DISCONTINUED | OUTPATIENT
Start: 2021-04-05 | End: 2021-04-05

## 2021-04-05 NOTE — ASU DISCHARGE PLAN (ADULT/PEDIATRIC) - ASU DC SPECIAL INSTRUCTIONSFT
Regular diet as tolerated, regular activity as tolerated, no heavy lifting for first two weeks.      Nothing per vagina: no intercourse, tampons or douching.      Call your provider if you experience fevers, chills, worsening abdominal pain, inability to urinate or worsening vaginal bleeding.    Follow up with your provider in 1 week.

## 2021-04-05 NOTE — BRIEF OPERATIVE NOTE - NSICDXBRIEFPROCEDURE_GEN_ALL_CORE_FT
PROCEDURES:  Dilation and curettage of uterus using suction with ultrasound guidance 05-Apr-2021 02:13:05  Maura Torres

## 2021-04-05 NOTE — BRIEF OPERATIVE NOTE - NSICDXBRIEFPOSTOP_GEN_ALL_CORE_FT
POST-OP DIAGNOSIS:  Retained products of conception, early pregnancy 05-Apr-2021 02:13:28  Maura Torres

## 2021-04-05 NOTE — ASU DISCHARGE PLAN (ADULT/PEDIATRIC) - CARE PROVIDER_API CALL
Mayte Valenzuela (MD)  Obstetrics and Gynecology  [facility] [place] [address]  Phone: [phone]  Fax: [phone]  Established Patient  Follow Up Time: 1 week

## 2021-04-05 NOTE — BRIEF OPERATIVE NOTE - OPERATION/FINDINGS
Approximately 7cm, anteverted uterus. Adnexa nonpalpable bilaterally. Cervix approximately 1cm dilated.

## 2021-04-05 NOTE — BRIEF OPERATIVE NOTE - NSICDXBRIEFPREOP_GEN_ALL_CORE_FT
PRE-OP DIAGNOSIS:  Retained products of conception, early pregnancy 05-Apr-2021 02:13:15  Maura Torres

## 2021-04-07 LAB — SURGICAL PATHOLOGY STUDY: SIGNIFICANT CHANGE UP

## 2021-04-15 ENCOUNTER — APPOINTMENT (OUTPATIENT)
Dept: OBGYN | Facility: CLINIC | Age: 31
End: 2021-04-15
Payer: MEDICAID

## 2021-04-15 VITALS
SYSTOLIC BLOOD PRESSURE: 108 MMHG | HEIGHT: 62 IN | TEMPERATURE: 97.3 F | HEART RATE: 89 BPM | DIASTOLIC BLOOD PRESSURE: 72 MMHG | BODY MASS INDEX: 23.37 KG/M2 | WEIGHT: 127 LBS

## 2021-04-15 DIAGNOSIS — Z30.9 ENCOUNTER FOR CONTRACEPTIVE MANAGEMENT, UNSPECIFIED: ICD-10-CM

## 2021-04-15 PROCEDURE — 99072 ADDL SUPL MATRL&STAF TM PHE: CPT

## 2021-04-15 PROCEDURE — 99024 POSTOP FOLLOW-UP VISIT: CPT

## 2021-04-15 PROCEDURE — 99214 OFFICE O/P EST MOD 30 MIN: CPT

## 2021-05-12 PROBLEM — Z30.9 CONTRACEPTION MANAGEMENT: Status: ACTIVE | Noted: 2021-05-12

## 2021-05-18 NOTE — OB RN DELIVERY SUMMARY - NS_LABORDURATION_OBGYN_ALL_OB_FT
Follow your After Visit Summary. Be consistent with your diet and vitamin K foods. Contact office with any medication changes including supplements or over the counter medicines. Monitor for any signs of bleeding or unusual bruising- call office or seek medical attention if they occur. Monitor for any signs of a clot such as sudden shortness of breath, chest pain, or redness, warmth, swelling of arms or legs- seek medical attention if they occur. Call office with any questions.
25 Hour(s) 27 Minute(s)

## 2021-08-12 ENCOUNTER — APPOINTMENT (OUTPATIENT)
Dept: OBGYN | Facility: CLINIC | Age: 31
End: 2021-08-12

## 2021-10-12 NOTE — ED PROVIDER NOTE - CARDIAC PEDAL EDEMA
Blood pressure initially 200/105 at arrival; s/p nitro ggt at [place], noncompliant with meds  - now much better controlled  - continue Nifedipine ER 90mg po daily  - c/w coreg 6.125mg bid, uptitrate as needed  - bumex as below  - home labetalol on hold due to JOSHUA  - c/t hold HCTZ-losartan given JOSHUA on CKD  - monitor BP  - would consider adding Hydralazine if BP remains uncontrolled absent

## 2022-02-01 NOTE — OB RN TRIAGE NOTE - NS_ARRIVALFROM_OBGYN_ALL_OB
Note Text (......Xxx Chief Complaint.): This diagnosis correlates with the Other (Free Text): Patient wants to start DUPIXENT back. Stated he is breaking out more and now thinks it was helping control the eczema. Render Risk Assessment In Note?: no Detail Level: Zone Home

## 2022-06-30 NOTE — H&P ADULT - NSRESEARCHGRANTASSESS_GEN_A_CORE
<<--- Click to launch IMPROVE-DD VTE Assessment DISPLAY PLAN FREE TEXT DISPLAY PLAN FREE TEXT DISPLAY PLAN FREE TEXT

## 2022-09-01 ENCOUNTER — ASOB RESULT (OUTPATIENT)
Age: 32
End: 2022-09-01

## 2022-09-01 ENCOUNTER — APPOINTMENT (OUTPATIENT)
Dept: OBGYN | Facility: CLINIC | Age: 32
End: 2022-09-01

## 2022-09-01 VITALS
SYSTOLIC BLOOD PRESSURE: 114 MMHG | WEIGHT: 135 LBS | BODY MASS INDEX: 24.84 KG/M2 | HEIGHT: 62 IN | DIASTOLIC BLOOD PRESSURE: 77 MMHG | HEART RATE: 77 BPM

## 2022-09-01 DIAGNOSIS — Z78.9 OTHER SPECIFIED HEALTH STATUS: ICD-10-CM

## 2022-09-01 DIAGNOSIS — E03.9 HYPOTHYROIDISM, UNSPECIFIED: ICD-10-CM

## 2022-09-01 DIAGNOSIS — O36.80X0 PREGNANCY WITH INCONCLUSIVE FETAL VIABILITY, NOT APPLICABLE OR UNSPECIFIED: ICD-10-CM

## 2022-09-01 PROCEDURE — 99214 OFFICE O/P EST MOD 30 MIN: CPT

## 2022-09-01 PROCEDURE — 76816 OB US FOLLOW-UP PER FETUS: CPT

## 2022-09-15 ENCOUNTER — LABORATORY RESULT (OUTPATIENT)
Age: 32
End: 2022-09-15

## 2022-09-15 ENCOUNTER — ASOB RESULT (OUTPATIENT)
Age: 32
End: 2022-09-15

## 2022-09-15 ENCOUNTER — APPOINTMENT (OUTPATIENT)
Dept: ANTEPARTUM | Facility: CLINIC | Age: 32
End: 2022-09-15

## 2022-09-15 PROCEDURE — 76813 OB US NUCHAL MEAS 1 GEST: CPT

## 2022-09-15 PROCEDURE — 76801 OB US < 14 WKS SINGLE FETUS: CPT

## 2022-09-23 ENCOUNTER — NON-APPOINTMENT (OUTPATIENT)
Age: 32
End: 2022-09-23

## 2022-09-29 ENCOUNTER — APPOINTMENT (OUTPATIENT)
Dept: OBGYN | Facility: CLINIC | Age: 32
End: 2022-09-29

## 2022-09-29 VITALS
HEIGHT: 62 IN | WEIGHT: 137 LBS | SYSTOLIC BLOOD PRESSURE: 106 MMHG | BODY MASS INDEX: 25.21 KG/M2 | DIASTOLIC BLOOD PRESSURE: 72 MMHG

## 2022-09-29 DIAGNOSIS — Z34.92 ENCOUNTER FOR SUPERVISION OF NORMAL PREGNANCY, UNSPECIFIED, SECOND TRIMESTER: ICD-10-CM

## 2022-09-29 PROBLEM — Z78.9 DENIES ALCOHOL CONSUMPTION: Status: ACTIVE | Noted: 2022-09-29

## 2022-09-29 PROBLEM — Z78.9 DOES NOT USE ILLICIT DRUGS: Status: ACTIVE | Noted: 2022-09-29

## 2022-09-29 PROBLEM — Z78.9 NON-SMOKER: Status: ACTIVE | Noted: 2022-09-29

## 2022-09-29 LAB
ABO + RH PNL BLD: NORMAL
ALBUMIN SERPL ELPH-MCNC: 4.4 G/DL
ALP BLD-CCNC: 90 U/L
ALT SERPL-CCNC: 8 U/L
ANION GAP SERPL CALC-SCNC: 16 MMOL/L
AR GENE MUT ANL BLD/T: NORMAL
AST SERPL-CCNC: 13 U/L
B19V IGG SER QL IA: 0.3 INDEX
B19V IGG+IGM SER-IMP: NEGATIVE
B19V IGG+IGM SER-IMP: NORMAL
B19V IGM FLD-ACNC: 0.11 INDEX
B19V IGM SER-ACNC: NEGATIVE
BACTERIA UR CULT: NORMAL
BASOPHILS # BLD AUTO: 0.02 K/UL
BASOPHILS NFR BLD AUTO: 0.2 %
BILIRUB SERPL-MCNC: 0.4 MG/DL
BLD GP AB SCN SERPL QL: NORMAL
BUN SERPL-MCNC: 6 MG/DL
C TRACH RRNA SPEC QL NAA+PROBE: NOT DETECTED
CALCIUM SERPL-MCNC: 9.2 MG/DL
CFTR MUT TESTED BLD/T: NEGATIVE
CHLORIDE SERPL-SCNC: 100 MMOL/L
CO2 SERPL-SCNC: 21 MMOL/L
CREAT SERPL-MCNC: 0.58 MG/DL
CYTOLOGY CVX/VAG DOC THIN PREP: NORMAL
EGFR: 122 ML/MIN/1.73M2
EOSINOPHIL # BLD AUTO: 0.1 K/UL
EOSINOPHIL NFR BLD AUTO: 1 %
ESTIMATED AVERAGE GLUCOSE: 108 MG/DL
FMR1 GENE MUT ANL BLD/T: NORMAL
GLUCOSE SERPL-MCNC: 43 MG/DL
HBA1C MFR BLD HPLC: 5.4 %
HBV SURFACE AG SER QL: NONREACTIVE
HCT VFR BLD CALC: 36.3 %
HCV AB SER QL: NONREACTIVE
HCV S/CO RATIO: 0.09 S/CO
HGB A MFR BLD: 97.3 %
HGB A2 MFR BLD: 2.7 %
HGB BLD-MCNC: 11.5 G/DL
HGB FRACT BLD-IMP: NORMAL
HIV1+2 AB SPEC QL IA.RAPID: NONREACTIVE
HPV HIGH+LOW RISK DNA PNL CVX: NOT DETECTED
IMM GRANULOCYTES NFR BLD AUTO: 0.4 %
LEAD BLD-MCNC: <1 UG/DL
LYMPHOCYTES # BLD AUTO: 1.98 K/UL
LYMPHOCYTES NFR BLD AUTO: 19.4 %
MAN DIFF?: NORMAL
MCHC RBC-ENTMCNC: 27.1 PG
MCHC RBC-ENTMCNC: 31.7 GM/DL
MCV RBC AUTO: 85.6 FL
MEV IGG FLD QL IA: >300 AU/ML
MEV IGG+IGM SER-IMP: POSITIVE
MONOCYTES # BLD AUTO: 0.62 K/UL
MONOCYTES NFR BLD AUTO: 6.1 %
N GONORRHOEA RRNA SPEC QL NAA+PROBE: NOT DETECTED
NEUTROPHILS # BLD AUTO: 7.47 K/UL
NEUTROPHILS NFR BLD AUTO: 72.9 %
PLATELET # BLD AUTO: 205 K/UL
POTASSIUM SERPL-SCNC: 4.2 MMOL/L
PROT SERPL-MCNC: 7 G/DL
RBC # BLD: 4.24 M/UL
RBC # FLD: 15.6 %
RUBV IGG FLD-ACNC: 24.5 INDEX
RUBV IGG SER-IMP: POSITIVE
SODIUM SERPL-SCNC: 138 MMOL/L
SOURCE AMPLIFICATION: NORMAL
T GONDII AB SER-IMP: NEGATIVE
T GONDII AB SER-IMP: NEGATIVE
T GONDII IGG SER QL: <3 IU/ML
T GONDII IGM SER QL: <3 AU/ML
T PALLIDUM AB SER QL IA: NEGATIVE
T3FREE SERPL-MCNC: 3.1 PG/ML
T4 FREE SERPL-MCNC: 1 NG/DL
TSH SERPL-ACNC: 2.92 UIU/ML
VZV AB TITR SER: POSITIVE
VZV IGG SER IF-ACNC: 1764 INDEX
WBC # FLD AUTO: 10.23 K/UL

## 2022-09-29 PROCEDURE — 0501F PRENATAL FLOW SHEET: CPT

## 2022-09-30 LAB — TSH SERPL-ACNC: 1.37 UIU/ML

## 2022-10-03 LAB
M TB IFN-G BLD-IMP: POSITIVE
QUANTIFERON TB PLUS MITOGEN MINUS NIL: 2.36 IU/ML
QUANTIFERON TB PLUS NIL: 0.03 IU/ML
QUANTIFERON TB PLUS TB1 MINUS NIL: 1.04 IU/ML
QUANTIFERON TB PLUS TB2 MINUS NIL: 0.55 IU/ML

## 2022-10-23 NOTE — DISCHARGE NOTE OB - CARE PROVIDER_API CALL
Dalton Robles)  Internal Medicine  [address] [facility] [address], [address]  [place] [address]  Phone: [phone]  Fax: [phone]  Follow Up Time:     [place] [address]  Phone: [phone]  Fax: [phone]  Follow Up Time:    Aram Osuna), Obstetrics and Gynecology  [address] [place] [address]  Phone: [phone]  Fax: [phone]

## 2022-10-27 ENCOUNTER — APPOINTMENT (OUTPATIENT)
Dept: OBGYN | Facility: CLINIC | Age: 32
End: 2022-10-27

## 2022-10-31 ENCOUNTER — ASOB RESULT (OUTPATIENT)
Age: 32
End: 2022-10-31

## 2022-10-31 ENCOUNTER — APPOINTMENT (OUTPATIENT)
Dept: ANTEPARTUM | Facility: CLINIC | Age: 32
End: 2022-10-31

## 2022-10-31 PROCEDURE — 76811 OB US DETAILED SNGL FETUS: CPT

## 2022-11-01 ENCOUNTER — APPOINTMENT (OUTPATIENT)
Dept: OBGYN | Facility: CLINIC | Age: 32
End: 2022-11-01

## 2022-11-01 VITALS
SYSTOLIC BLOOD PRESSURE: 96 MMHG | HEART RATE: 88 BPM | HEIGHT: 62 IN | DIASTOLIC BLOOD PRESSURE: 65 MMHG | BODY MASS INDEX: 25.96 KG/M2 | WEIGHT: 141.1 LBS

## 2022-11-01 PROCEDURE — 0502F SUBSEQUENT PRENATAL CARE: CPT

## 2022-11-01 RX ORDER — DOXYLAMINE SUCCINATE AND PYRIDOXINE HYDROCHLORIDE 10; 10 MG/1; MG/1
10-10 TABLET, DELAYED RELEASE ORAL
Qty: 60 | Refills: 2 | Status: DISCONTINUED | COMMUNITY
Start: 2022-09-01 | End: 2022-11-01

## 2022-11-02 ENCOUNTER — APPOINTMENT (OUTPATIENT)
Dept: ANTEPARTUM | Facility: CLINIC | Age: 32
End: 2022-11-02

## 2022-11-04 ENCOUNTER — APPOINTMENT (OUTPATIENT)
Dept: ANTEPARTUM | Facility: CLINIC | Age: 32
End: 2022-11-04

## 2022-11-04 ENCOUNTER — ASOB RESULT (OUTPATIENT)
Age: 32
End: 2022-11-04

## 2022-11-04 PROCEDURE — 76816 OB US FOLLOW-UP PER FETUS: CPT

## 2022-11-17 ENCOUNTER — NON-APPOINTMENT (OUTPATIENT)
Age: 32
End: 2022-11-17

## 2022-11-17 ENCOUNTER — OUTPATIENT (OUTPATIENT)
Dept: OUTPATIENT SERVICES | Facility: HOSPITAL | Age: 32
LOS: 1 days | Discharge: HOME | End: 2022-11-17

## 2022-11-17 VITALS
HEART RATE: 76 BPM | DIASTOLIC BLOOD PRESSURE: 60 MMHG | SYSTOLIC BLOOD PRESSURE: 103 MMHG | RESPIRATION RATE: 14 BRPM | TEMPERATURE: 98 F

## 2022-11-17 VITALS — SYSTOLIC BLOOD PRESSURE: 129 MMHG | DIASTOLIC BLOOD PRESSURE: 58 MMHG | HEART RATE: 82 BPM

## 2022-11-17 DIAGNOSIS — O26.899 OTHER SPECIFIED PREGNANCY RELATED CONDITIONS, UNSPECIFIED TRIMESTER: ICD-10-CM

## 2022-11-17 DIAGNOSIS — Z3A.00 WEEKS OF GESTATION OF PREGNANCY NOT SPECIFIED: ICD-10-CM

## 2022-11-17 PROCEDURE — 59025 FETAL NON-STRESS TEST: CPT | Mod: 26

## 2022-11-17 PROCEDURE — 76815 OB US LIMITED FETUS(S): CPT | Mod: 26

## 2022-11-17 PROCEDURE — 99202 OFFICE O/P NEW SF 15 MIN: CPT | Mod: 25

## 2022-11-17 NOTE — OB PROVIDER TRIAGE NOTE - HISTORY OF PRESENT ILLNESS
32y  at 38w4d presents to triage for evaluation, Pt s/p fall yesterday at 6pm. States that she slipped and fell on her hands in a sitting positive.   Denies any abdominal trauma. Denies any contractions or cramping.  Reports +FM, no vaginal bleeding, no ROM or LOF  Prenatal care: Dr Watts, denies any prenatal complications so far.    GYN: Denies   OBH:   - 10/7/17 FT  7#12, no comp  - 19 FT  6#, no comp  - TOP x 1  PAST MEDICAL & SURGICAL HISTORY:  - Hypothyroidism diagnosed in 2017  MEDS:  - Levothyroxine 50mcg daily  NKDA

## 2022-11-17 NOTE — OB PROVIDER TRIAGE NOTE - NSOBPROVIDERNOTE_OBGYN_ALL_OB_FT
32y  at 23w2d s/p fall >24hrs ago without any abdominal trauma.  O positive  Fetal monitoring  Bedside sono done  D/w Dr Bronson  D/c home with instructions   labor precautions given  Pt to follow up with OB as scheduled  Pt to return to triage if decreased fetal movements, vaginal bleeding, strong contractions or ruptured membranes

## 2022-11-17 NOTE — OB PROVIDER TRIAGE NOTE - NSHPPHYSICALEXAM_GEN_ALL_CORE
T(C): 36.9  HR: 76 (11-17-22 @ 21:25) (76 - 76)  BP: 103/60 (11-17-22 @ 21:25) (103/60 - 103/60)    Heart: RRR  Lungs: CTA  Abdomen: Gravid, soft, NT    NST: Reactive with moderate variability, no decels or variables  [place]: No contractions  TAS: SLIUP, Transverse, BRIAN: MVP: 5.41; +FM

## 2022-11-17 NOTE — OB PROVIDER TRIAGE NOTE - PLAN OF CARE
D/c home with instructions   labor precautions given  Pt to follow up with OB as scheduled  Pt to return to triage if decreased fetal movements, vaginal bleeding, strong contractions or ruptured membranes

## 2022-11-17 NOTE — OB RN TRIAGE NOTE - FALL HARM RISK - UNIVERSAL INTERVENTIONS
Bed in lowest position, wheels locked, appropriate side rails in place/Call bell, personal items and telephone in reach/Instruct patient to call for assistance before getting out of bed or chair/Non-slip footwear when patient is out of bed/[place] to call system/Physically safe environment - no spills, clutter or unnecessary equipment/Purposeful Proactive Rounding/Room/bathroom lighting operational, light cord in reach

## 2022-12-05 ENCOUNTER — APPOINTMENT (OUTPATIENT)
Dept: OBGYN | Facility: CLINIC | Age: 32
End: 2022-12-05

## 2022-12-05 VITALS
HEIGHT: 62 IN | HEART RATE: 80 BPM | SYSTOLIC BLOOD PRESSURE: 111 MMHG | WEIGHT: 147 LBS | BODY MASS INDEX: 27.05 KG/M2 | DIASTOLIC BLOOD PRESSURE: 74 MMHG

## 2022-12-05 PROCEDURE — 0502F SUBSEQUENT PRENATAL CARE: CPT

## 2022-12-07 ENCOUNTER — APPOINTMENT (OUTPATIENT)
Dept: OBGYN | Facility: CLINIC | Age: 32
End: 2022-12-07

## 2022-12-07 VITALS
WEIGHT: 147 LBS | HEIGHT: 62 IN | BODY MASS INDEX: 27.05 KG/M2 | DIASTOLIC BLOOD PRESSURE: 58 MMHG | SYSTOLIC BLOOD PRESSURE: 97 MMHG

## 2022-12-07 DIAGNOSIS — O22.02: ICD-10-CM

## 2022-12-07 DIAGNOSIS — D64.9 ANEMIA, UNSPECIFIED: ICD-10-CM

## 2022-12-07 LAB
BASOPHILS # BLD AUTO: 0.05 K/UL
BASOPHILS NFR BLD AUTO: 0.4 %
EOSINOPHIL # BLD AUTO: 0.26 K/UL
EOSINOPHIL NFR BLD AUTO: 2.1 %
GLUCOSE 1H P 100 G GLC PO SERPL-MCNC: 75 MG/DL
HCT VFR BLD CALC: 32.7 %
HGB BLD-MCNC: 9.9 G/DL
IMM GRANULOCYTES NFR BLD AUTO: 3.6 %
LYMPHOCYTES # BLD AUTO: 2.37 K/UL
LYMPHOCYTES NFR BLD AUTO: 19.2 %
MAN DIFF?: NORMAL
MCHC RBC-ENTMCNC: 27.4 PG
MCHC RBC-ENTMCNC: 30.3 GM/DL
MCV RBC AUTO: 90.6 FL
MONOCYTES # BLD AUTO: 0.81 K/UL
MONOCYTES NFR BLD AUTO: 6.6 %
NEUTROPHILS # BLD AUTO: 8.42 K/UL
NEUTROPHILS NFR BLD AUTO: 68.1 %
PLATELET # BLD AUTO: 139 K/UL
RBC # BLD: 3.61 M/UL
RBC # FLD: 17.2 %
T PALLIDUM AB SER QL IA: NEGATIVE
WBC # FLD AUTO: 12.35 K/UL

## 2022-12-07 PROCEDURE — 0502F SUBSEQUENT PRENATAL CARE: CPT

## 2022-12-27 ENCOUNTER — OUTPATIENT (OUTPATIENT)
Dept: OUTPATIENT SERVICES | Facility: HOSPITAL | Age: 32
LOS: 1 days | End: 2022-12-27
Payer: MEDICAID

## 2022-12-27 ENCOUNTER — APPOINTMENT (OUTPATIENT)
Dept: OBGYN | Facility: CLINIC | Age: 32
End: 2022-12-27

## 2022-12-27 ENCOUNTER — APPOINTMENT (OUTPATIENT)
Dept: RADIOLOGY | Facility: CLINIC | Age: 32
End: 2022-12-27
Payer: MEDICAID

## 2022-12-27 VITALS
BODY MASS INDEX: 27.23 KG/M2 | WEIGHT: 148 LBS | HEIGHT: 62 IN | SYSTOLIC BLOOD PRESSURE: 104 MMHG | DIASTOLIC BLOOD PRESSURE: 67 MMHG | HEART RATE: 92 BPM

## 2022-12-27 DIAGNOSIS — R76.12 NONSPECIFIC REACTION TO CELL MEDIATED IMMUNITY MEASUREMENT OF GAMMA INTERFERON ANTIGEN RESPONSE W/OUT ACTIVE TUBERCULOSIS: ICD-10-CM

## 2022-12-27 DIAGNOSIS — R76.12 NONSPECIFIC REACTION TO CELL MEDIATED IMMUNITY MEASUREMENT OF GAMMA INTERFERON ANTIGEN RESPONSE WITHOUT ACTIVE TUBERCULOSIS: ICD-10-CM

## 2022-12-27 PROCEDURE — 71045 X-RAY EXAM CHEST 1 VIEW: CPT

## 2022-12-27 PROCEDURE — 0502F SUBSEQUENT PRENATAL CARE: CPT

## 2022-12-27 PROCEDURE — 71045 X-RAY EXAM CHEST 1 VIEW: CPT | Mod: 26

## 2022-12-29 ENCOUNTER — NON-APPOINTMENT (OUTPATIENT)
Age: 32
End: 2022-12-29

## 2023-01-10 ENCOUNTER — EMERGENCY (EMERGENCY)
Facility: HOSPITAL | Age: 33
LOS: 1 days | Discharge: HOME | End: 2023-01-10
Admitting: EMERGENCY MEDICINE
Payer: MEDICAID

## 2023-01-10 VITALS
RESPIRATION RATE: 18 BRPM | SYSTOLIC BLOOD PRESSURE: 109 MMHG | OXYGEN SATURATION: 97 % | DIASTOLIC BLOOD PRESSURE: 67 MMHG | TEMPERATURE: 99 F | HEART RATE: 105 BPM

## 2023-01-10 LAB
FLUAV AG NPH QL: DETECTED
FLUBV AG NPH QL: DETECTED
RSV RNA NPH QL NAA+NON-PROBE: SIGNIFICANT CHANGE UP
SARS-COV-2 RNA SPEC QL NAA+PROBE: SIGNIFICANT CHANGE UP

## 2023-01-10 PROCEDURE — 99284 EMERGENCY DEPT VISIT MOD MDM: CPT

## 2023-01-10 RX ORDER — SODIUM CHLORIDE 9 MG/ML
1000 INJECTION INTRAMUSCULAR; INTRAVENOUS; SUBCUTANEOUS ONCE
Refills: 0 | Status: COMPLETED | OUTPATIENT
Start: 2023-01-10 | End: 2023-01-10

## 2023-01-10 RX ORDER — ACETAMINOPHEN 500 MG
1000 TABLET ORAL ONCE
Refills: 0 | Status: COMPLETED | OUTPATIENT
Start: 2023-01-10 | End: 2023-01-10

## 2023-01-10 RX ADMIN — SODIUM CHLORIDE 1000 MILLILITER(S): 9 INJECTION INTRAMUSCULAR; INTRAVENOUS; SUBCUTANEOUS at 04:54

## 2023-01-10 RX ADMIN — Medication 400 MILLIGRAM(S): at 04:52

## 2023-01-10 RX ADMIN — Medication 75 MILLIGRAM(S): at 04:54

## 2023-01-10 NOTE — ED PROVIDER NOTE - PATIENT PORTAL LINK FT
You can access the FollowMyHealth Patient Portal offered by [facility] by registering at the following website: [email or web address]. By joining [employer]’s FollowMyHealth portal, you will also be able to view your health information using other applications (apps) compatible with our system.

## 2023-01-10 NOTE — ED PROVIDER NOTE - OBJECTIVE STATEMENT
32-year-old female no reported past medical history 28 weeks gestation follows with Dr. Valenzuela presents with sore throat bilateral ear pain cough body aches fever x2 days.  Has been swab positive for the flu earlier this week.  Patient has been taking Tylenol with relief of fever however returns.  Patient denies any chest pain shortness of breath abdominal pain nausea vomiting diarrhea vaginal bleeding or discharge back pain.

## 2023-01-10 NOTE — PROVIDER CONTACT NOTE (OTHER) - BACKGROUND
Pt. is at 28 weeks gestation with no pregnancy complications at this time. Denies any contractions, cramping, bleeding. Denies decreased fetal movement. PMH includes hypothyroidism on synthroid.

## 2023-01-10 NOTE — PROVIDER CONTACT NOTE (OTHER) - ASSESSMENT
NST completed in ED. FHR baseline: 145, moderate variability noted with positive accelerations and no decelerations noted.

## 2023-01-10 NOTE — ED PROVIDER NOTE - NSFOLLOWUPINSTRUCTIONS_ED_ALL_ED_FT
Prior to immunization administration, verified patients identity using patient s name and date of birth. Please see Immunization Activity for additional information.     Screening Questionnaire for Pediatric Immunization    Is the child sick today?   No   Does the child have allergies to medications, food, a vaccine component, or latex?   No   Has the child had a serious reaction to a vaccine in the past?   No   Does the child have a long-term health problem with lung, heart, kidney or metabolic disease (e.g., diabetes), asthma, a blood disorder, no spleen, complement component deficiency, a cochlear implant, or a spinal fluid leak?  Is he/she on long-term aspirin therapy?   No   If the child to be vaccinated is 2 through 4 years of age, has a healthcare provider told you that the child had wheezing or asthma in the  past 12 months?   No   If your child is a baby, have you ever been told he or she has had intussusception?   No   Has the child, sibling or parent had a seizure, has the child had brain or other nervous system problems?   No   Does the child have cancer, leukemia, AIDS, or any immune system         problem?   No   Does the child have a parent, brother, or sister with an immune system problem?   No   In the past 3 months, has the child taken medications that affect the immune system such as prednisone, other steroids, or anticancer drugs; drugs for the treatment of rheumatoid arthritis, Crohn s disease, or psoriasis; or had radiation treatments?   No   In the past year, has the child received a transfusion of blood or blood products, or been given immune (gamma) globulin or an antiviral drug?   No   Is the child/teen pregnant or is there a chance that she could become       pregnant during the next month?   No   Has the child received any vaccinations in the past 4 weeks?   No      Immunization questionnaire answers were all negative.        [place] given to patient.    Per  orders of Dr. MOY, injection of PENTACEL, HEP B & PREVNAR given by María Ramos CMA. Patient instructed to remain in clinic for 15 minutes afterwards, and to report any adverse reaction to me immediately.    Screening performed by María Ramos CMA on 3/1/2021 at 11:36 AM.     You presented to the emergency department with fever or flu-like symptoms. You were deemed healthy enough to go home at this time and do not require hospitalization. When you go home, please practice safe and sound hygiene by washing your hands frequently, covering your nose/mouth when coughing/sneezing and self quarantining yourself at home until your symptoms improve or go away. If possible, stay away from young children and the elderly as much as possible. Return immediately to the hospital if your symptoms worsen such as you are unable to breath, you start having fevers that are not improved with taking over the counter medications such as tylenol every 4 hours, you cannot eat or drink without throwing up, or you pass out. Otherwise, please follow up with your doctor in the next 1-2 days or when you get better so that they can make sure you are improving and that you do not need more work up for your condition.

## 2023-01-10 NOTE — PROVIDER CONTACT NOTE (OTHER) - SITUATION
Pt. is a 33 y/o female coming into ED for fever, sore throat, cough. Denies any OBGYN related complaints. RN notified MD Coats regarding patient's NST completed in ED.

## 2023-01-10 NOTE — ED ADULT NURSE NOTE - OBJECTIVE STATEMENT
Patient A&Ox4 ambulatory c/o flu like symptoms. Patient states  has same symptoms and was seen here and tested +FLU. Patient states approx. 28 weeks pregnant, denies any pregnancy concerns. Respirations even and unlabored. Vitals stable. Patient denies cp, sob, n/v/d, and urinary symptoms. 20G IV placed to arm, medicated per orders. Awaiting NST and dispo. Stretcher in lowest position, wheels locked, appropriate side rails in place, call bell in reach.

## 2023-01-10 NOTE — CHART NOTE - NSCHARTNOTEFT_GEN_A_CORE
NST reviewed  Mod yamil +accel -decel   Occ ctx on toco  Reactive and reassuring  Reviewed with Dr. Enzo Coats PGY4

## 2023-01-10 NOTE — ED ADULT TRIAGE NOTE - CHIEF COMPLAINT QUOTE
pt c/o fever, body aches and sore throat x2 days. Tmax 100.2. Pt 28 weeks pregnant denies any OB complaints

## 2023-01-10 NOTE — ED PROVIDER NOTE - CLINICAL SUMMARY MEDICAL DECISION MAKING FREE TEXT BOX
32-year-old female no reported past medical history 28 weeks gestation follows with Dr. Valenzuela presents with sore throat bilateral ear pain cough body aches fever x2 days.  Has been swab positive for the flu earlier this week.  Patient has been taking Tylenol with relief of fever however returns.  Patient denies any chest pain shortness of breath abdominal pain nausea vomiting diarrhea vaginal bleeding or discharge back pain.  plan  - symptomatic treatment  - ivf  - flu swab   - tamiflu

## 2023-01-10 NOTE — ED ADULT NURSE NOTE - NSFALLRSKASSESSTYPE_ED_ALL_ED
[facility]    Hospitalist Progress Note    Date of Service (when I saw the patient): 10/09/2017    Assessment & Plan   Britney Schaeffer is a 86 year old female who was admitted on 10/7/2017.        1.  Colitis:   Patient presented to ER with N/V and some diarrhea along with abdominal pain.  She had been having some constipation issues which is not uncommon  Took extra stool softener  and a big BM followed by the above symptoms.  No fevers.  WBC mildly elevated.  CT done which shows suspicious colitis wit thickened walls of the sigmoid colon ? Of mass but a lot of artifact from the KE.  Also some SB enteritis findings R lower quadrant.  Stool sent off and C diff negative. Others pending. Lives [place]  [place] outbreaks there.  Was put on cipro flagyl on admission.  Today is very alert  Still some mild nausea. Still wit loose stools, no bleeding .  Exam has some tenderness  more so on RLQ than left. No rebound no masses  BS+.  Still is uncomfortable on exam not sure really what to do in the 85 yo female  remains hemodynamically stable  Labs unremarkable no fevers. Taking in some clears. If still with significant abdominal pain tomorrow consider surgery evaluation   No obvious indication for  surgery at this time but not sure where to go next.  No IV flagyl is available, debby just use Cipro for now  to sure is really doing anything at this time . Dont' think this is ischemic bowel at all  Her xray today shows nonspecific bowel gas per radiology.     2.  Parkinson's disease: Continue Sinemet.  Has tremor but otherwise relatively stable.3      3.  Hypertension: Continuing on Lopressor.  BP stable.  JVD is flat.     4.  Hyperlipidemia: Continue statins but hold for now as the patient is nothing by mouth.      5.  Coronary artery disease: Hold aspirin and Plavix due to increased risk of bleeding.  Continue on beta blockers .Her last stent was grater than 1 year ago in 2015.      6.  GERD: Continue on IV Protonix  during inpatient stay. Has a tremendous hiatal hernia with most of her stomach in  thoracic cavity.      7.  History of CVA: Holding aspirin and Plavix. No neurological issues.       DVT Prophylaxis: Pneumatic Compression Devices  Code Status: DNR/DNI    Disposition: Expected discharge once abdominal pain resolved and eating.    Brent Garduno    Interval History   Had seers large loose stools last nights  No fever labs relatively unremarkable   to palpation her lower abdomen especially on right.    -Data reviewed today: I reviewed all new labs and imaging results over the last 24 hours. I personally reviewed no images or EKG's today.    Physical Exam   Temp: 98.9  F (37.2  C) Temp src: Tympanic BP: (!) 124/49   Heart Rate: 67 Resp: 18 SpO2: 96 % O2 Device: None (Room air)    Vitals:    10/07/17 2321 10/08/17 0416 10/09/17 0614   Weight: 57.4 kg (126 lb 8.7 oz) 57 kg (125 lb 10.6 oz) 59.2 kg (130 lb 8.2 oz)     Vital Signs with Ranges  Temp:  [97.9  F (36.6  C)-99  F (37.2  C)] 98.9  F (37.2  C)  Heart Rate:  [62-76] 67  Resp:  [18] 18  BP: (120-140)/(46-62) 124/49  SpO2:  [94 %-96 %] 96 %  I/O last 3 completed shifts:  In: 3384 [P.O.:785; I.V.:2599]  Out: -     Peripheral IV 10/09/17 Right Lower forearm (Active)   Site Assessment WDL 10/9/2017  8:00 AM   Line Status Infusing 10/9/2017  8:00 AM   Phlebitis Scale 0-->no symptoms 10/9/2017  8:00 AM   Infiltration Scale 0 10/9/2017  8:00 AM   Number of days:0     No line/device    Constitutional: Alert and oriented x3. No distress    HEENT: NC/AT, PERRL, EOMI, mouth moist , neck supple, no LN.     Cardiovascular: RRR. no Murmur, no  rubs, or gallops.  JVD flat.  Bruits no.  Pulses 2+    Respiratory: Clear to auscultation bilaterally    Abdomen: Soft, tender Right lower abdomen No masses  No rebound., no organomegaly. Bowel sounds present    Extremities: Warm/dry. noedema    Neuro:   Non focal, cranial nerves intact, Moves all extremities.    Medications      NaCl 100 mL/hr at 10/09/17 0551       sodium chloride  1 spray Both Nostrils At Bedtime     carbidopa-levodopa  1 tablet Oral 4x Daily     gabapentin  100 mg Oral BID     lidocaine  1 patch Transdermal Q24H     metoprolol  12.5 mg Oral BID     nystatin   Topical BID     olopatadine  1 drop Both Eyes BID     pantoprazole  40 mg Intravenous QAM AC     ciprofloxacin  400 mg Intravenous Q12H     metroNIDAZOLE  500 mg Intravenous Q6H     lidocaine   Transdermal Q24h     lidocaine   Transdermal Q8H       Data     Recent Labs  Lab 10/09/17  0555 10/08/17  0555 10/07/17  1516   WBC 8.0 15.4* 11.9*   HGB 11.9 13.1 13.5   MCV 92 90 93   * 135* 165    144 141   POTASSIUM 3.3* 3.4 3.7   CHLORIDE 114* 109 108   CO2 19* 22 27   BUN 15 21 24   CR 0.56 0.60 0.68   ANIONGAP 10 13 6   BAKARI 8.0* 8.6 9.2   GLC 77 120* 129*   ALBUMIN  --   --  3.8   PROTTOTAL  --   --  7.2   BILITOTAL  --   --  0.5   ALKPHOS  --   --  55   ALT  --   --  9   AST  --   --  17   LIPASE  --   --  199     Lactic Acid   Date Value Ref Range Status   12/14/2015 0.9 0.4 - 2.0 mmol/L Final   12/12/2015 1.6 0.4 - 2.0 mmol/L Final   12/12/2015 2.2 (H) 0.4 - 2.0 mmol/L Final       Recent Results (from the past 24 hour(s))   XR Abdomen 2 Views    Narrative    PROCEDURE:  XR ABDOMEN 2 VW    HISTORY:  Colitis, diarrhea.    TECHNIQUE:  Upright and supine views of the abdomen were obtained.    COMPARISON:  CT abdomen and pelvis 10/7/2017    FINDINGS:     There is a nonspecific, but nonobstructive bowel gas pattern. No free  air is seen. Stimulator device is present in the sacrum. Right hip  prosthesis is noted. There are degenerative changes in the spine.      Impression    IMPRESSION:    Nonspecific but nonobstructive bowel gas pattern.    CYNDI JEFFERSON MD            Initial (On Arrival)

## 2023-01-11 NOTE — ED POST DISCHARGE NOTE - REASON FOR FOLLOW-UP
Influenza A and Influenza B detected message left with Call Back  P.A. number and hours for return call back. Other

## 2023-01-12 ENCOUNTER — NON-APPOINTMENT (OUTPATIENT)
Age: 33
End: 2023-01-12

## 2023-01-19 ENCOUNTER — APPOINTMENT (OUTPATIENT)
Dept: ANTEPARTUM | Facility: CLINIC | Age: 33
End: 2023-01-19
Payer: MEDICAID

## 2023-01-19 ENCOUNTER — APPOINTMENT (OUTPATIENT)
Dept: OBGYN | Facility: CLINIC | Age: 33
End: 2023-01-19
Payer: MEDICAID

## 2023-01-19 ENCOUNTER — ASOB RESULT (OUTPATIENT)
Age: 33
End: 2023-01-19

## 2023-01-19 VITALS
HEART RATE: 80 BPM | DIASTOLIC BLOOD PRESSURE: 72 MMHG | BODY MASS INDEX: 27.09 KG/M2 | WEIGHT: 147.2 LBS | HEIGHT: 62 IN | SYSTOLIC BLOOD PRESSURE: 108 MMHG

## 2023-01-19 DIAGNOSIS — Z23 ENCOUNTER FOR IMMUNIZATION: ICD-10-CM

## 2023-01-19 PROCEDURE — 0502F SUBSEQUENT PRENATAL CARE: CPT

## 2023-01-19 PROCEDURE — 76819 FETAL BIOPHYS PROFIL W/O NST: CPT

## 2023-01-19 PROCEDURE — 90715 TDAP VACCINE 7 YRS/> IM: CPT

## 2023-01-19 PROCEDURE — 90471 IMMUNIZATION ADMIN: CPT

## 2023-01-19 PROCEDURE — 76816 OB US FOLLOW-UP PER FETUS: CPT

## 2023-02-02 ENCOUNTER — APPOINTMENT (OUTPATIENT)
Dept: OBGYN | Facility: CLINIC | Age: 33
End: 2023-02-02
Payer: MEDICAID

## 2023-02-02 VITALS
DIASTOLIC BLOOD PRESSURE: 72 MMHG | BODY MASS INDEX: 27.23 KG/M2 | HEIGHT: 62 IN | SYSTOLIC BLOOD PRESSURE: 104 MMHG | HEART RATE: 87 BPM | WEIGHT: 148 LBS

## 2023-02-02 PROCEDURE — 0502F SUBSEQUENT PRENATAL CARE: CPT

## 2023-02-16 ENCOUNTER — APPOINTMENT (OUTPATIENT)
Dept: OBGYN | Facility: CLINIC | Age: 33
End: 2023-02-16
Payer: MEDICAID

## 2023-02-16 VITALS
SYSTOLIC BLOOD PRESSURE: 105 MMHG | BODY MASS INDEX: 27.79 KG/M2 | WEIGHT: 151 LBS | HEIGHT: 62 IN | DIASTOLIC BLOOD PRESSURE: 71 MMHG

## 2023-02-16 PROCEDURE — 0502F SUBSEQUENT PRENATAL CARE: CPT

## 2023-02-17 LAB
BASOPHILS # BLD AUTO: 0.04 K/UL
BASOPHILS NFR BLD AUTO: 0.3 %
EOSINOPHIL # BLD AUTO: 0.08 K/UL
EOSINOPHIL NFR BLD AUTO: 0.6 %
HCT VFR BLD CALC: 32.9 %
HGB BLD-MCNC: 10.1 G/DL
HIV1+2 AB SPEC QL IA.RAPID: NONREACTIVE
IMM GRANULOCYTES NFR BLD AUTO: 1.7 %
LYMPHOCYTES # BLD AUTO: 2.1 K/UL
LYMPHOCYTES NFR BLD AUTO: 15.3 %
MAN DIFF?: NORMAL
MCHC RBC-ENTMCNC: 26.8 PG
MCHC RBC-ENTMCNC: 30.7 GM/DL
MCV RBC AUTO: 87.3 FL
MONOCYTES # BLD AUTO: 0.83 K/UL
MONOCYTES NFR BLD AUTO: 6 %
NEUTROPHILS # BLD AUTO: 10.44 K/UL
NEUTROPHILS NFR BLD AUTO: 76.1 %
PLATELET # BLD AUTO: 142 K/UL
RBC # BLD: 3.77 M/UL
RBC # FLD: 17.2 %
WBC # FLD AUTO: 13.73 K/UL

## 2023-02-20 LAB
GP B STREP DNA SPEC QL NAA+PROBE: NOT DETECTED
SOURCE GBS: NORMAL

## 2023-02-23 ENCOUNTER — APPOINTMENT (OUTPATIENT)
Dept: OBGYN | Facility: CLINIC | Age: 33
End: 2023-02-23
Payer: MEDICAID

## 2023-02-23 VITALS
BODY MASS INDEX: 27.79 KG/M2 | SYSTOLIC BLOOD PRESSURE: 103 MMHG | HEIGHT: 62 IN | DIASTOLIC BLOOD PRESSURE: 64 MMHG | WEIGHT: 151 LBS

## 2023-02-23 PROCEDURE — 0502F SUBSEQUENT PRENATAL CARE: CPT

## 2023-02-23 RX ORDER — CHLORHEXIDINE GLUCONATE 4 %
325 (65 FE) LIQUID (ML) TOPICAL DAILY
Qty: 30 | Refills: 3 | Status: ACTIVE | COMMUNITY
Start: 2022-12-07 | End: 1900-01-01

## 2023-02-23 RX ORDER — FOLIC ACID/MULTIVIT,IRON,MINER 0.4MG-18MG
200 TABLET ORAL
Qty: 90 | Refills: 3 | Status: ACTIVE | COMMUNITY
Start: 2022-09-01 | End: 1900-01-01

## 2023-03-01 ENCOUNTER — APPOINTMENT (OUTPATIENT)
Dept: OBGYN | Facility: CLINIC | Age: 33
End: 2023-03-01
Payer: MEDICAID

## 2023-03-01 VITALS
DIASTOLIC BLOOD PRESSURE: 74 MMHG | HEIGHT: 62 IN | SYSTOLIC BLOOD PRESSURE: 112 MMHG | WEIGHT: 153 LBS | HEART RATE: 86 BPM | BODY MASS INDEX: 28.16 KG/M2

## 2023-03-01 PROCEDURE — 0502F SUBSEQUENT PRENATAL CARE: CPT

## 2023-03-09 ENCOUNTER — OUTPATIENT (OUTPATIENT)
Dept: OUTPATIENT SERVICES | Facility: HOSPITAL | Age: 33
LOS: 1 days | Discharge: HOME | End: 2023-03-09
Payer: MEDICAID

## 2023-03-09 ENCOUNTER — NON-APPOINTMENT (OUTPATIENT)
Age: 33
End: 2023-03-09

## 2023-03-09 ENCOUNTER — APPOINTMENT (OUTPATIENT)
Dept: ANTEPARTUM | Facility: CLINIC | Age: 33
End: 2023-03-09

## 2023-03-09 ENCOUNTER — APPOINTMENT (OUTPATIENT)
Dept: OBGYN | Facility: CLINIC | Age: 33
End: 2023-03-09

## 2023-03-09 VITALS
OXYGEN SATURATION: 100 % | RESPIRATION RATE: 18 BRPM | HEART RATE: 88 BPM | DIASTOLIC BLOOD PRESSURE: 64 MMHG | SYSTOLIC BLOOD PRESSURE: 105 MMHG | TEMPERATURE: 97 F

## 2023-03-09 VITALS — HEART RATE: 91 BPM | DIASTOLIC BLOOD PRESSURE: 61 MMHG | SYSTOLIC BLOOD PRESSURE: 107 MMHG

## 2023-03-09 PROCEDURE — 99212 OFFICE O/P EST SF 10 MIN: CPT | Mod: 25

## 2023-03-09 PROCEDURE — 59025 FETAL NON-STRESS TEST: CPT | Mod: 26

## 2023-03-09 RX ORDER — LEVOTHYROXINE SODIUM 125 MCG
1 TABLET ORAL
Qty: 0 | Refills: 0 | DISCHARGE

## 2023-03-09 NOTE — OB PROVIDER TRIAGE NOTE - NSOBPROVIDERNOTE_OBGYN_ALL_OB_FT
32y  at 39 2/7d R/O Labor   discuss with Dr. Grover  no S/S of labor at this time.    stable for discharge  Discharge patient home.  pt to keep self very well hydrated  Labor precautions and fetal movements count were reviewed; if not in labor, will follow up with OB for the next schedule appointment.  Plan of care was reviewed with patient and family; patient states understanding of the above plan.

## 2023-03-09 NOTE — OB PROVIDER TRIAGE NOTE - NSPRIMARYCAREPROV_OBGYN_ALL_OB
What Type Of Note Output Would You Prefer (Optional)?: Bullet Format
How Severe Is Your Skin Lesion?: moderate
Has Your Skin Lesion Been Treated?: not been treated
Is This A New Presentation, Or A Follow-Up?: Skin Lesion
Additional History: Declines fbe \\nWants lump in left cheek examined
MD//JYOTI/DEVANG

## 2023-03-09 NOTE — OB PROVIDER TRIAGE NOTE - NSHPPHYSICALEXAM_GEN_ALL_CORE
T(C): 36.9  HR: 76 (11-17-22 @ 21:25) (76 - 76)  BP: 103/60 (11-17-22 @ 21:25) (103/60 - 103/60)    Heart: RRR  Lungs: CTA  Abdomen: Gravid, soft, NT    NST: Reactive with moderate variability, no decels or variables  [place]: irregular contra  TAS: Vital Signs Last 24 Hrs  T(C): 36.3 (09 Mar 2023 17:13), Max: 36.3 (09 Mar 2023 17:13)  T(F): 97.3 (09 Mar 2023 17:13), Max: 97.3 (09 Mar 2023 17:13)  HR: 91 (09 Mar 2023 20:05) (80 - 101)  BP: 107/61 (09 Mar 2023 20:05) (91/52 - 109/64)  RR: 18 (09 Mar 2023 17:13) (18 - 18)  SpO2: 100% (09 Mar 2023 17:13) (100% - 100%)    Parameters below as of 09 Mar 2023 17:13  Patient On (Oxygen Delivery Method): room air        Heart: RRR  Lungs: CTA  Abdomen: Gravid, soft, NT    NST: Reactive with moderate variability, no decels or variables  [place]: irregular contractions  TAS: cephalic presentation, Posterior placenta, BRIAN 11.11 BPP 8/8  SVE: 1.5/50/-3

## 2023-03-09 NOTE — OB PROVIDER TRIAGE NOTE - HISTORY OF PRESENT ILLNESS
32y  at 38w4d presents to triage for evaluation, Pt s/p fall yesterday at 6pm. States that she slipped and fell on her hands in a sitting positive.   Denies any abdominal trauma. Denies any contractions or cramping.  Reports +FM, no vaginal bleeding, no ROM or LOF  Prenatal care: Dr Watts, denies any prenatal complications so far.    GYN: Denies   OBH:   - 10/7/17 FT  7#12, no comp  - 19 FT  6#, no comp  - TOP x 1  PAST MEDICAL & SURGICAL HISTORY:  - Hypothyroidism diagnosed in 2017  MEDS:  - Levothyroxine 50mcg daily  NKDA 32y  at 39 2/7d presents to triage for c/o contractions irregular, every 5-7 minutes, pain 7/10 denies vaginal bleeding, leakage of fluid, and reportspositive fetal movement.  Denies fever, chills, headaches, changes in vision, chest pain, palpitations, shortness of breath, cough, nausea, vomiting, diarrhea, constipation, urinary symptoms, edema.  Prenatal care: Dr Watts, denies any prenatal complications so far.  GBS status is negative. as per patient  Patient denies signs and symptoms of COVID 19; denies symptomatic illness; fully vaccinated.    GYN: Denies   OBH:   - 10/7/17 FT  7#12, no comp  - 19 FT  6#, 2 vessels cord Postpartum D&C   PAST MEDICAL & SURGICAL HISTORY:  - Hypothyroidism diagnosed in 2017  MEDS:  - Levothyroxine 50mcg daily  -PNV  NKDA

## 2023-03-11 ENCOUNTER — OUTPATIENT (OUTPATIENT)
Dept: INPATIENT UNIT | Facility: HOSPITAL | Age: 33
LOS: 1 days | Discharge: HOME | End: 2023-03-11
Payer: MEDICAID

## 2023-03-11 VITALS — SYSTOLIC BLOOD PRESSURE: 97 MMHG | HEART RATE: 100 BPM | DIASTOLIC BLOOD PRESSURE: 53 MMHG

## 2023-03-11 VITALS
SYSTOLIC BLOOD PRESSURE: 111 MMHG | TEMPERATURE: 98 F | RESPIRATION RATE: 16 BRPM | HEART RATE: 94 BPM | DIASTOLIC BLOOD PRESSURE: 65 MMHG

## 2023-03-11 DIAGNOSIS — O26.899 OTHER SPECIFIED PREGNANCY RELATED CONDITIONS, UNSPECIFIED TRIMESTER: ICD-10-CM

## 2023-03-11 PROCEDURE — 59025 FETAL NON-STRESS TEST: CPT | Mod: 26

## 2023-03-11 PROCEDURE — 99212 OFFICE O/P EST SF 10 MIN: CPT | Mod: 25

## 2023-03-11 NOTE — OB PROVIDER TRIAGE NOTE - NSHPPHYSICALEXAM_GEN_ALL_CORE
Vital Signs Last 24 Hrs  T(C): 36.6 (11 Mar 2023 19:45), Max: 36.6 (11 Mar 2023 19:45)  T(F): 97.9 (11 Mar 2023 19:45), Max: 97.9 (11 Mar 2023 19:45)  HR: 88 (11 Mar 2023 19:55) (88 - 94)  BP: 111/66 (11 Mar 2023 19:55) (111/65 - 111/66)  RR: 16 (11 Mar 2023 19:45) (16 - 16)    Heart: RRR  Lungs: CTA  Abdomen: Gravid, soft, NT    NST: Reactive with moderate variability, no decels or variables  [place]: irregular contractions  TAS: cephalic presentation, Posterior placenta  SVE: 1.5/50/-3

## 2023-03-11 NOTE — OB PROVIDER TRIAGE NOTE - HISTORY OF PRESENT ILLNESS
32y  at 39 4/7d presents to triage for c/o contractions irregular, every 5-7 minutes, pain 7/10 denies vaginal bleeding, leakage of fluid, and reports positive fetal movement.  Denies fever, chills, headaches, changes in vision, chest pain, palpitations, shortness of breath, cough, nausea, vomiting, diarrhea, constipation, urinary symptoms, edema.  Prenatal care: Dr Watts, denies any prenatal complications so far.  GBS status is negative. as per patient  Patient denies signs and symptoms of COVID 19; denies symptomatic illness; fully vaccinated.    GYN: Denies   OBH:   - 10/7/17 FT  7#12, no comp  - 19 FT  6#, 2 vessels cord Postpartum D&C   PAST MEDICAL & SURGICAL HISTORY:  - Hypothyroidism diagnosed in 2017  MEDS:  - Levothyroxine 50mcg daily  -PNV  NKDA

## 2023-03-11 NOTE — OB PROVIDER TRIAGE NOTE - NSOBPROVIDERNOTE_OBGYN_ALL_OB_FT
32y  at 39 2/7d R/O Labor   discuss with Dr. Sauceda  no S/S of labor at this time.    stable for discharge  Discharge patient home.  pt to keep self very well hydrated  Labor precautions and fetal movements count were reviewed; if not in labor, will follow up with OB for the next schedule appointment.  Plan of care was reviewed with patient and family; patient states understanding of the above plan. 32y  at 39 4/7d R/O Labor   discuss with Dr. Sauceda  no S/S of labor at this time.    stable for discharge  Discharge patient home.  pt to keep self very well hydrated  Labor precautions and fetal movements count were reviewed; if not in labor, will follow up with OB for the next schedule appointment.  Plan of care was reviewed with patient and family; patient states understanding of the above plan.  discharged @

## 2023-03-13 ENCOUNTER — APPOINTMENT (OUTPATIENT)
Dept: OBGYN | Facility: CLINIC | Age: 33
End: 2023-03-13
Payer: MEDICAID

## 2023-03-13 VITALS
SYSTOLIC BLOOD PRESSURE: 114 MMHG | WEIGHT: 155 LBS | HEIGHT: 62 IN | DIASTOLIC BLOOD PRESSURE: 74 MMHG | HEART RATE: 100 BPM | BODY MASS INDEX: 28.52 KG/M2

## 2023-03-13 DIAGNOSIS — O47.1 FALSE LABOR AT OR AFTER 37 COMPLETED WEEKS OF GESTATION: ICD-10-CM

## 2023-03-13 DIAGNOSIS — Z34.93 ENCOUNTER FOR SUPERVISION OF NORMAL PREGNANCY, UNSPECIFIED, THIRD TRIMESTER: ICD-10-CM

## 2023-03-13 DIAGNOSIS — Z3A.39 39 WEEKS GESTATION OF PREGNANCY: ICD-10-CM

## 2023-03-13 DIAGNOSIS — O99.283 ENDOCRINE, NUTRITIONAL AND METABOLIC DISEASES COMPLICATING PREGNANCY, THIRD TRIMESTER: ICD-10-CM

## 2023-03-13 DIAGNOSIS — Z87.59 PERSONAL HISTORY OF OTHER COMPLICATIONS OF PREGNANCY, CHILDBIRTH AND THE PUERPERIUM: ICD-10-CM

## 2023-03-13 DIAGNOSIS — E03.9 HYPOTHYROIDISM, UNSPECIFIED: ICD-10-CM

## 2023-03-13 PROCEDURE — 0502F SUBSEQUENT PRENATAL CARE: CPT

## 2023-03-14 ENCOUNTER — TRANSCRIPTION ENCOUNTER (OUTPATIENT)
Age: 33
End: 2023-03-14

## 2023-03-14 ENCOUNTER — INPATIENT (INPATIENT)
Facility: HOSPITAL | Age: 33
LOS: 1 days | Discharge: HOME | End: 2023-03-16
Attending: OBSTETRICS & GYNECOLOGY | Admitting: OBSTETRICS & GYNECOLOGY
Payer: MEDICAID

## 2023-03-14 VITALS
HEART RATE: 91 BPM | SYSTOLIC BLOOD PRESSURE: 124 MMHG | TEMPERATURE: 98 F | RESPIRATION RATE: 18 BRPM | DIASTOLIC BLOOD PRESSURE: 78 MMHG

## 2023-03-14 DIAGNOSIS — O26.899 OTHER SPECIFIED PREGNANCY RELATED CONDITIONS, UNSPECIFIED TRIMESTER: ICD-10-CM

## 2023-03-14 DIAGNOSIS — O47.1 FALSE LABOR AT OR AFTER 37 COMPLETED WEEKS OF GESTATION: ICD-10-CM

## 2023-03-14 DIAGNOSIS — Z3A.39 39 WEEKS GESTATION OF PREGNANCY: ICD-10-CM

## 2023-03-14 DIAGNOSIS — E03.9 HYPOTHYROIDISM, UNSPECIFIED: ICD-10-CM

## 2023-03-14 DIAGNOSIS — Z87.59 PERSONAL HISTORY OF OTHER COMPLICATIONS OF PREGNANCY, CHILDBIRTH AND THE PUERPERIUM: ICD-10-CM

## 2023-03-14 DIAGNOSIS — O99.283 ENDOCRINE, NUTRITIONAL AND METABOLIC DISEASES COMPLICATING PREGNANCY, THIRD TRIMESTER: ICD-10-CM

## 2023-03-14 LAB
ANISOCYTOSIS BLD QL: SLIGHT — SIGNIFICANT CHANGE UP
BASOPHILS # BLD AUTO: 0 K/UL — SIGNIFICANT CHANGE UP (ref 0–0.2)
BASOPHILS NFR BLD AUTO: 0 % — SIGNIFICANT CHANGE UP (ref 0–2)
BLD GP AB SCN SERPL QL: NEGATIVE — SIGNIFICANT CHANGE UP
COVID-19 SPIKE DOMAIN AB INTERP: POSITIVE
COVID-19 SPIKE DOMAIN ANTIBODY RESULT: >250 U/ML — HIGH
EOSINOPHIL # BLD AUTO: 0 K/UL — SIGNIFICANT CHANGE UP (ref 0–0.5)
EOSINOPHIL NFR BLD AUTO: 0 % — SIGNIFICANT CHANGE UP (ref 0–6)
GIANT PLATELETS BLD QL SMEAR: PRESENT — SIGNIFICANT CHANGE UP
HCT VFR BLD CALC: 33.7 % — LOW (ref 34.5–45)
HGB BLD-MCNC: 10.6 G/DL — LOW (ref 11.5–15.5)
HYPOCHROMIA BLD QL: SLIGHT — SIGNIFICANT CHANGE UP
IANC: 9.05 K/UL — HIGH (ref 1.8–7.4)
LYMPHOCYTES # BLD AUTO: 19.3 % — SIGNIFICANT CHANGE UP (ref 13–44)
LYMPHOCYTES # BLD AUTO: 2.49 K/UL — SIGNIFICANT CHANGE UP (ref 1–3.3)
MANUAL SMEAR VERIFICATION: SIGNIFICANT CHANGE UP
MCHC RBC-ENTMCNC: 26.4 PG — LOW (ref 27–34)
MCHC RBC-ENTMCNC: 31.5 GM/DL — LOW (ref 32–36)
MCV RBC AUTO: 84 FL — SIGNIFICANT CHANGE UP (ref 80–100)
MICROCYTES BLD QL: SLIGHT — SIGNIFICANT CHANGE UP
MONOCYTES # BLD AUTO: 0.34 K/UL — SIGNIFICANT CHANGE UP (ref 0–0.9)
MONOCYTES NFR BLD AUTO: 2.6 % — SIGNIFICANT CHANGE UP (ref 2–14)
MYELOCYTES NFR BLD: 1.8 % — HIGH (ref 0–0)
NEUTROPHILS # BLD AUTO: 9.51 K/UL — HIGH (ref 1.8–7.4)
NEUTROPHILS NFR BLD AUTO: 73.7 % — SIGNIFICANT CHANGE UP (ref 43–77)
OVALOCYTES BLD QL SMEAR: SLIGHT — SIGNIFICANT CHANGE UP
PLAT MORPH BLD: ABNORMAL
PLATELET # BLD AUTO: 139 K/UL — LOW (ref 150–400)
PLATELET COUNT - ESTIMATE: ABNORMAL
POLYCHROMASIA BLD QL SMEAR: SLIGHT — SIGNIFICANT CHANGE UP
RBC # BLD: 4.01 M/UL — SIGNIFICANT CHANGE UP (ref 3.8–5.2)
RBC # FLD: 16.6 % — HIGH (ref 10.3–14.5)
RBC BLD AUTO: SIGNIFICANT CHANGE UP
RH IG SCN BLD-IMP: POSITIVE — SIGNIFICANT CHANGE UP
SARS-COV-2 IGG+IGM SERPL QL IA: >250 U/ML — HIGH
SARS-COV-2 IGG+IGM SERPL QL IA: POSITIVE
SARS-COV-2 RNA SPEC QL NAA+PROBE: SIGNIFICANT CHANGE UP
SMUDGE CELLS # BLD: PRESENT — SIGNIFICANT CHANGE UP
T PALLIDUM AB TITR SER: NEGATIVE — SIGNIFICANT CHANGE UP
VARIANT LYMPHS # BLD: 2.6 % — SIGNIFICANT CHANGE UP (ref 0–6)
WBC # BLD: 12.91 K/UL — HIGH (ref 3.8–10.5)
WBC # FLD AUTO: 12.91 K/UL — HIGH (ref 3.8–10.5)

## 2023-03-14 PROCEDURE — 59400 OBSTETRICAL CARE: CPT | Mod: U9,UB,GC

## 2023-03-14 RX ORDER — OXYTOCIN 10 UNIT/ML
333.33 VIAL (ML) INJECTION
Qty: 20 | Refills: 0 | Status: DISCONTINUED | OUTPATIENT
Start: 2023-03-14 | End: 2023-03-14

## 2023-03-14 RX ORDER — ACETAMINOPHEN 500 MG
3 TABLET ORAL
Qty: 0 | Refills: 0 | DISCHARGE
Start: 2023-03-14

## 2023-03-14 RX ORDER — SODIUM CHLORIDE 9 MG/ML
3 INJECTION INTRAMUSCULAR; INTRAVENOUS; SUBCUTANEOUS EVERY 8 HOURS
Refills: 0 | Status: DISCONTINUED | OUTPATIENT
Start: 2023-03-14 | End: 2023-03-16

## 2023-03-14 RX ORDER — LANOLIN
1 OINTMENT (GRAM) TOPICAL EVERY 6 HOURS
Refills: 0 | Status: DISCONTINUED | OUTPATIENT
Start: 2023-03-14 | End: 2023-03-16

## 2023-03-14 RX ORDER — DIBUCAINE 1 %
1 OINTMENT (GRAM) RECTAL EVERY 6 HOURS
Refills: 0 | Status: DISCONTINUED | OUTPATIENT
Start: 2023-03-14 | End: 2023-03-16

## 2023-03-14 RX ORDER — ACETAMINOPHEN 500 MG
975 TABLET ORAL
Refills: 0 | Status: DISCONTINUED | OUTPATIENT
Start: 2023-03-14 | End: 2023-03-16

## 2023-03-14 RX ORDER — CHLORHEXIDINE GLUCONATE 213 G/1000ML
1 SOLUTION TOPICAL ONCE
Refills: 0 | Status: DISCONTINUED | OUTPATIENT
Start: 2023-03-14 | End: 2023-03-14

## 2023-03-14 RX ORDER — HYDROCORTISONE 1 %
1 OINTMENT (GRAM) TOPICAL EVERY 6 HOURS
Refills: 0 | Status: DISCONTINUED | OUTPATIENT
Start: 2023-03-14 | End: 2023-03-16

## 2023-03-14 RX ORDER — OXYCODONE HYDROCHLORIDE 5 MG/1
5 TABLET ORAL ONCE
Refills: 0 | Status: DISCONTINUED | OUTPATIENT
Start: 2023-03-14 | End: 2023-03-16

## 2023-03-14 RX ORDER — LEVOTHYROXINE SODIUM 125 MCG
50 TABLET ORAL DAILY
Refills: 0 | Status: DISCONTINUED | OUTPATIENT
Start: 2023-03-14 | End: 2023-03-16

## 2023-03-14 RX ORDER — TETANUS TOXOID, REDUCED DIPHTHERIA TOXOID AND ACELLULAR PERTUSSIS VACCINE, ADSORBED 5; 2.5; 8; 8; 2.5 [IU]/.5ML; [IU]/.5ML; UG/.5ML; UG/.5ML; UG/.5ML
0.5 SUSPENSION INTRAMUSCULAR ONCE
Refills: 0 | Status: DISCONTINUED | OUTPATIENT
Start: 2023-03-14 | End: 2023-03-16

## 2023-03-14 RX ORDER — OXYCODONE HYDROCHLORIDE 5 MG/1
5 TABLET ORAL
Refills: 0 | Status: DISCONTINUED | OUTPATIENT
Start: 2023-03-14 | End: 2023-03-16

## 2023-03-14 RX ORDER — SODIUM CHLORIDE 9 MG/ML
1000 INJECTION, SOLUTION INTRAVENOUS ONCE
Refills: 0 | Status: DISCONTINUED | OUTPATIENT
Start: 2023-03-14 | End: 2023-03-14

## 2023-03-14 RX ORDER — SODIUM CHLORIDE 9 MG/ML
1000 INJECTION, SOLUTION INTRAVENOUS
Refills: 0 | Status: DISCONTINUED | OUTPATIENT
Start: 2023-03-14 | End: 2023-03-14

## 2023-03-14 RX ORDER — SIMETHICONE 80 MG/1
80 TABLET, CHEWABLE ORAL EVERY 4 HOURS
Refills: 0 | Status: DISCONTINUED | OUTPATIENT
Start: 2023-03-14 | End: 2023-03-16

## 2023-03-14 RX ORDER — MAGNESIUM HYDROXIDE 400 MG/1
30 TABLET, CHEWABLE ORAL
Refills: 0 | Status: DISCONTINUED | OUTPATIENT
Start: 2023-03-14 | End: 2023-03-16

## 2023-03-14 RX ORDER — BENZOCAINE 10 %
1 GEL (GRAM) MUCOUS MEMBRANE EVERY 6 HOURS
Refills: 0 | Status: DISCONTINUED | OUTPATIENT
Start: 2023-03-14 | End: 2023-03-16

## 2023-03-14 RX ORDER — IBUPROFEN 200 MG
600 TABLET ORAL EVERY 6 HOURS
Refills: 0 | Status: COMPLETED | OUTPATIENT
Start: 2023-03-14 | End: 2024-02-10

## 2023-03-14 RX ORDER — OXYTOCIN 10 UNIT/ML
41.67 VIAL (ML) INJECTION
Qty: 20 | Refills: 0 | Status: DISCONTINUED | OUTPATIENT
Start: 2023-03-14 | End: 2023-03-14

## 2023-03-14 RX ORDER — CITRIC ACID/SODIUM CITRATE 300-500 MG
15 SOLUTION, ORAL ORAL EVERY 6 HOURS
Refills: 0 | Status: DISCONTINUED | OUTPATIENT
Start: 2023-03-14 | End: 2023-03-14

## 2023-03-14 RX ORDER — DIPHENHYDRAMINE HCL 50 MG
25 CAPSULE ORAL EVERY 6 HOURS
Refills: 0 | Status: DISCONTINUED | OUTPATIENT
Start: 2023-03-14 | End: 2023-03-16

## 2023-03-14 RX ORDER — AER TRAVELER 0.5 G/1
1 SOLUTION RECTAL; TOPICAL EVERY 4 HOURS
Refills: 0 | Status: DISCONTINUED | OUTPATIENT
Start: 2023-03-14 | End: 2023-03-16

## 2023-03-14 RX ORDER — PRAMOXINE HYDROCHLORIDE 150 MG/15G
1 AEROSOL, FOAM RECTAL EVERY 4 HOURS
Refills: 0 | Status: DISCONTINUED | OUTPATIENT
Start: 2023-03-14 | End: 2023-03-16

## 2023-03-14 RX ORDER — KETOROLAC TROMETHAMINE 30 MG/ML
30 SYRINGE (ML) INJECTION ONCE
Refills: 0 | Status: DISCONTINUED | OUTPATIENT
Start: 2023-03-14 | End: 2023-03-14

## 2023-03-14 RX ORDER — IBUPROFEN 200 MG
600 TABLET ORAL EVERY 6 HOURS
Refills: 0 | Status: DISCONTINUED | OUTPATIENT
Start: 2023-03-14 | End: 2023-03-16

## 2023-03-14 RX ORDER — IBUPROFEN 200 MG
1 TABLET ORAL
Qty: 0 | Refills: 0 | DISCHARGE
Start: 2023-03-14

## 2023-03-14 RX ADMIN — Medication 975 MILLIGRAM(S): at 21:40

## 2023-03-14 RX ADMIN — Medication 600 MILLIGRAM(S): at 17:45

## 2023-03-14 RX ADMIN — SODIUM CHLORIDE 3 MILLILITER(S): 9 INJECTION INTRAMUSCULAR; INTRAVENOUS; SUBCUTANEOUS at 10:30

## 2023-03-14 RX ADMIN — Medication 30 MILLIGRAM(S): at 06:32

## 2023-03-14 RX ADMIN — Medication 30 MILLIGRAM(S): at 07:37

## 2023-03-14 RX ADMIN — Medication 600 MILLIGRAM(S): at 12:02

## 2023-03-14 RX ADMIN — Medication 975 MILLIGRAM(S): at 09:40

## 2023-03-14 RX ADMIN — Medication 600 MILLIGRAM(S): at 18:16

## 2023-03-14 RX ADMIN — Medication 975 MILLIGRAM(S): at 20:43

## 2023-03-14 RX ADMIN — Medication 975 MILLIGRAM(S): at 14:46

## 2023-03-14 RX ADMIN — Medication 975 MILLIGRAM(S): at 15:00

## 2023-03-14 RX ADMIN — SODIUM CHLORIDE 3 MILLILITER(S): 9 INJECTION INTRAMUSCULAR; INTRAVENOUS; SUBCUTANEOUS at 20:00

## 2023-03-14 RX ADMIN — Medication 975 MILLIGRAM(S): at 08:27

## 2023-03-14 RX ADMIN — OXYCODONE HYDROCHLORIDE 5 MILLIGRAM(S): 5 TABLET ORAL at 21:40

## 2023-03-14 RX ADMIN — OXYCODONE HYDROCHLORIDE 5 MILLIGRAM(S): 5 TABLET ORAL at 20:45

## 2023-03-14 RX ADMIN — Medication 1 TABLET(S): at 12:01

## 2023-03-14 RX ADMIN — OXYCODONE HYDROCHLORIDE 5 MILLIGRAM(S): 5 TABLET ORAL at 15:30

## 2023-03-14 RX ADMIN — Medication 600 MILLIGRAM(S): at 12:35

## 2023-03-14 RX ADMIN — OXYCODONE HYDROCHLORIDE 5 MILLIGRAM(S): 5 TABLET ORAL at 15:00

## 2023-03-14 NOTE — OB PROVIDER DELIVERY SUMMARY - NSPROVIDERDELIVERYNOTE_OBGYN_ALL_OB_FT
Spontaneous vaginal delivery of liveborn infant from OA position. Head delivered with nuchal cord x1. Shoulders and body delivered easily. Infant was suctioned.  Light meconium present. Cord was clamped and cut and infant was passed to pediatricians for fetal assessment. Placenta delivered intact with a 3 vessel cord. Fundal massage was given and uterine fundus was found to be firm. Vaginal exam revealed an intact cervix, vaginal walls and sulci. Patient had a 2nd degree laceration in the perineum that was repaired with 2.0 chromic suture. Excellent hemostasis was noted. Count was correct x 2.     QBL: 172cc  Attending: KIANA Giraldo MD  Assistant: NOHEMY Waller NP

## 2023-03-14 NOTE — OB PROVIDER H&P - HISTORY OF PRESENT ILLNESS
31y/o  @40wks presents with painful contractions and urge to push. Pain scale 10/10  Reports good fetal movement  Denies LOF/VB    PAST MEDICAL & SURGICAL HISTORY:  - Hypothyroidism diagnosed in 2017  MEDS:  - Levothyroxine 50mcg daily  -PNV  NKDA

## 2023-03-14 NOTE — DISCHARGE NOTE OB - CARE PROVIDER_API CALL
Mayte Valenzuela (MD)  Obstetrics and Gynecology  [facility] [place] [address]  Phone: [phone]  Fax: [phone]  Follow Up Time:

## 2023-03-14 NOTE — DISCHARGE NOTE OB - NS MD DC FALL RISK RISK
For information on Fall & Injury Prevention, visit: [email or web address] OR  [email or web address] OR  https://www.cdc.gov/steadi/patient.html

## 2023-03-14 NOTE — OB PROVIDER DELIVERY SUMMARY - NSSELHIDDEN_OBGYN_ALL_OB_FT
[NS_DeliveryAttending1_OBGYN_ALL_OB_FT:[id number]],[NS_DeliveryAssist1_OBGYN_ALL_OB_FT:[id number]]

## 2023-03-14 NOTE — DISCHARGE NOTE OB - CARE PLAN
1 Principal Discharge DX:	Vaginal delivery  Assessment and plan of treatment:	After discharge, please stay on pelvic rest for 6 weeks, meaning no sexual intercourse, no tampons and no douching. Expect to have vaginal bleeding/spotting for up to six weeks.  The bleeding should get lighter and more white/light brown with time.  For bleeding soaking more than a pad an hour or passing clots greater than the size of your fist, come in to the emergency department.  Follow up in the office in 6 weeks

## 2023-03-14 NOTE — OB RN DELIVERY SUMMARY - NS_SEPSISRSKCALC_OBGYN_ALL_OB_FT
EOS calculated successfully. EOS Risk Factor: 0.5/1000 live births ([facility] national incidence); GA=40w;Temp=98.42; ROM=0.4; GBS='Negative'; Antibiotics='No antibiotics or any antibiotics < 2 hrs prior to birth'

## 2023-03-14 NOTE — OB RN DELIVERY SUMMARY - NSSELHIDDEN_OBGYN_ALL_OB_FT
[NS_DeliveryAttending1_OBGYN_ALL_OB_FT:[id number]],[NS_DeliveryAssist1_OBGYN_ALL_OB_FT:[id number]],[NS_DeliveryRN_OBGYN_ALL_OB_FT:[id number]]

## 2023-03-14 NOTE — DISCHARGE NOTE OB - PLAN OF CARE
After discharge, please stay on pelvic rest for 6 weeks, meaning no sexual intercourse, no tampons and no douching. Expect to have vaginal bleeding/spotting for up to six weeks.  The bleeding should get lighter and more white/light brown with time.  For bleeding soaking more than a pad an hour or passing clots greater than the size of your fist, come in to the emergency department.  Follow up in the office in 6 weeks

## 2023-03-14 NOTE — OB NEONATOLOGY/PEDIATRICIAN DELIVERY SUMMARY - NS_FINALEDD_OBGYN_ALL_OB_DT
S/p lap appy    Doing well, had bm and is eating      Incisions no erythema    A/p    Return to work next week, see prn  
14-Mar-2023

## 2023-03-14 NOTE — DISCHARGE NOTE OB - MATERIALS PROVIDED
Vaccinations/[facility]  Screening Program/  Immunization Record/Breastfeeding Log/Bottle Feeding Log/Breastfeeding Mother’s Support Group Information/Guide to Postpartum Care/[facility] Hearing Screen Program

## 2023-03-14 NOTE — OB NEONATOLOGY/PEDIATRICIAN DELIVERY SUMMARY - NSPEDSNEONOTESA_OBGYN_ALL_OB_FT
Peds called to delivery for meconium. 40wk male born via  to a 32y/o [id number] blood type O+ mother. Maternal history of hypothyroidism on Synthroid. No significant prenatal history. PNL -/-/NR/I, GBS - on . AROM at 05:16AM on 3/14 with light meconium fluids. Baby emerged vigorous, crying, was w/d/s/s with APGARS of 8/9 for color. Mom plans to initiate breastfeeding and formula feed, consents Hep B vaccine and declines circ. EOS 0.06. Highest maternal temp 36.9C. Maternal COVID pending.     Physical Exam:  Gen: no acute distress, +grimace  HEENT:  anterior fontanel open soft and flat, nondysmorphic facies, no cleft lip/palate, ears normal set, nares clinically patent  Resp: Normal respiratory effort without grunting or retractions, clear to auscultation bilaterally  Cardio: Present S1/S2, regular rate and rhythm, no murmurs  Abd: soft, non tender, non distended, umbilical cord with 3 vessels  Neuro: +palmar and plantar grasp, +suck, +kailash, normal tone  Extremities: negative perez and ortolani maneuvers, moving all extremities, no clavicular crepitus or stepoff  Skin: pink, warm  Genitals: Normal male anatomy, testicles palpable in scrotum b/l, Charles 1, anus patent Peds called to delivery for meconium. 40wk male born via  to a 32y/o [id number] blood type O+ mother. Maternal history of hypothyroidism on Synthroid. No significant prenatal history. PNL -/-/NR/I, GBS - on . AROM at 05:16AM on 3/14 with light meconium fluids. Nuchal x1. Baby emerged vigorous, crying, was w/d/s/s with APGARS of 8/9 for color. Mom plans to initiate breastfeeding and formula feed, consents Hep B vaccine and declines circ. EOS 0.06. Highest maternal temp 36.9C. Maternal COVID pending.     Physical Exam:  Gen: no acute distress, +grimace  HEENT:  anterior fontanel open soft and flat, nondysmorphic facies, no cleft lip/palate, ears normal set, nares clinically patent  Resp: Normal respiratory effort without grunting or retractions, clear to auscultation bilaterally  Cardio: Present S1/S2, regular rate and rhythm, no murmurs  Abd: soft, non tender, non distended, umbilical cord with 3 vessels  Neuro: +palmar and plantar grasp, +suck, +kailash, normal tone  Extremities: negative perez and ortolani maneuvers, moving all extremities, no clavicular crepitus or stepoff  Skin: pink, warm  Genitals: Normal male anatomy, testicles palpable in scrotum b/l, Charles 1, anus patent

## 2023-03-14 NOTE — OB RN PATIENT PROFILE - FALL HARM RISK - UNIVERSAL INTERVENTIONS
Bed in lowest position, wheels locked, appropriate side rails in place/Call bell, personal items and telephone in reach/Instruct patient to call for assistance before getting out of bed or chair/Non-slip footwear when patient is out of bed/[place] to call system/Physically safe environment - no spills, clutter or unnecessary equipment/Purposeful Proactive Rounding/Room/bathroom lighting operational, light cord in reach

## 2023-03-14 NOTE — OB PROVIDER H&P - NSHPPHYSICALEXAM_GEN_ALL_CORE
Vital Signs Last 24 Hrs  T(C): 36.8 (14 Mar 2023 01:39), Max: 36.8 (14 Mar 2023 01:37)  T(F): 98.24 (14 Mar 2023 01:39), Max: 98.24 (14 Mar 2023 01:39)  HR: 91 (14 Mar 2023 01:42) (91 - 91)  BP: 124/78 (14 Mar 2023 01:42) (124/78 - 124/78)  RR: 18 (14 Mar 2023 01:37) (18 - 18)    Assessment reveals VSS  General: Female sitting comfortably in no apparent distress  Neuro: No facial asymmetry, no slurred speech, moves all 4 extremities  Mood: Alert and lucid, appropriate mood and affect  A&Ox3  Lungs- clear bilateral  Heart- normal rate and regular rhythm  Extremities- Warm, Dry, no edema present, good pulses   Abdomen soft, NT, gravid  Cat 1 tracing, ctx every 2-3 mins  Transabdominal Ultrasound- vtx  Vaginal Exam- 3/80/-3      PLAN: Admit for Labor

## 2023-03-14 NOTE — DISCHARGE NOTE OB - PATIENT PORTAL LINK FT
You can access the FollowMyHealth Patient Portal offered by [facility] by registering at the following website: [email or web address]. By joining [email or web address]’s [email or web address] portal, you will also be able to view your health information using other applications (apps) compatible with our system.

## 2023-03-14 NOTE — OB PROVIDER H&P - PROBLEM SELECTOR PLAN 1
Admit for Labor  D/W Dr. Giraldo  Routine Orders  Covid Swabbed  Epidural for pain management   GBS negative 2/16  Expectant management

## 2023-03-14 NOTE — DISCHARGE NOTE OB - MEDICATION SUMMARY - MEDICATIONS TO TAKE
I will START or STAY ON the medications listed below when I get home from the hospital:    acetaminophen 325 mg oral tablet  -- 3 tab(s) by mouth every 6 hours  -- Indication: For Pain    ibuprofen 600 mg oral tablet  -- 1 tab(s) by mouth every 6 hours  -- Indication: For Pain    levothyroxine 50 mcg (0.05 mg) oral tablet  -- 1 tab(s) by mouth once a day  -- Indication: For Home med

## 2023-03-14 NOTE — OB PROVIDER DELIVERY SUMMARY - NSLOWPPHRISK_OBGYN_A_OB
No previous uterine incision/[place] Pregnancy/Less than or equal to 4 previous vaginal births/No known bleeding disorder/No history of postpartum hemorrhage/No other PPH risks indicated

## 2023-03-15 RX ADMIN — Medication 600 MILLIGRAM(S): at 12:07

## 2023-03-15 RX ADMIN — Medication 600 MILLIGRAM(S): at 23:53

## 2023-03-15 RX ADMIN — Medication 975 MILLIGRAM(S): at 09:10

## 2023-03-15 RX ADMIN — MAGNESIUM HYDROXIDE 30 MILLILITER(S): 400 TABLET, CHEWABLE ORAL at 15:09

## 2023-03-15 RX ADMIN — Medication 600 MILLIGRAM(S): at 18:22

## 2023-03-15 RX ADMIN — Medication 975 MILLIGRAM(S): at 16:00

## 2023-03-15 RX ADMIN — Medication 975 MILLIGRAM(S): at 10:00

## 2023-03-15 RX ADMIN — Medication 600 MILLIGRAM(S): at 05:37

## 2023-03-15 RX ADMIN — SODIUM CHLORIDE 3 MILLILITER(S): 9 INJECTION INTRAMUSCULAR; INTRAVENOUS; SUBCUTANEOUS at 14:00

## 2023-03-15 RX ADMIN — Medication 1 TABLET(S): at 12:06

## 2023-03-15 RX ADMIN — Medication 50 MICROGRAM(S): at 05:37

## 2023-03-15 RX ADMIN — OXYCODONE HYDROCHLORIDE 5 MILLIGRAM(S): 5 TABLET ORAL at 21:30

## 2023-03-15 RX ADMIN — Medication 600 MILLIGRAM(S): at 00:07

## 2023-03-15 RX ADMIN — Medication 600 MILLIGRAM(S): at 01:00

## 2023-03-15 RX ADMIN — OXYCODONE HYDROCHLORIDE 5 MILLIGRAM(S): 5 TABLET ORAL at 09:12

## 2023-03-15 RX ADMIN — OXYCODONE HYDROCHLORIDE 5 MILLIGRAM(S): 5 TABLET ORAL at 10:00

## 2023-03-15 RX ADMIN — Medication 975 MILLIGRAM(S): at 04:00

## 2023-03-15 RX ADMIN — SODIUM CHLORIDE 3 MILLILITER(S): 9 INJECTION INTRAMUSCULAR; INTRAVENOUS; SUBCUTANEOUS at 05:51

## 2023-03-15 RX ADMIN — Medication 975 MILLIGRAM(S): at 03:09

## 2023-03-15 RX ADMIN — Medication 975 MILLIGRAM(S): at 21:30

## 2023-03-15 RX ADMIN — Medication 975 MILLIGRAM(S): at 20:52

## 2023-03-15 RX ADMIN — OXYCODONE HYDROCHLORIDE 5 MILLIGRAM(S): 5 TABLET ORAL at 20:52

## 2023-03-15 RX ADMIN — Medication 975 MILLIGRAM(S): at 15:10

## 2023-03-15 RX ADMIN — Medication 600 MILLIGRAM(S): at 13:00

## 2023-03-15 RX ADMIN — Medication 600 MILLIGRAM(S): at 06:36

## 2023-03-15 NOTE — PROGRESS NOTE ADULT - SUBJECTIVE AND OBJECTIVE BOX
S: Patient doing well. Minimal lochia. Pain controlled.    O: Vital Signs Last 24 Hrs  T(C): 36.6 (15 Mar 2023 06:02), Max: 37 (14 Mar 2023 14:00)  T(F): 97.8 (15 Mar 2023 06:02), Max: 98.6 (14 Mar 2023 14:00)  HR: 94 (15 Mar 2023 06:02) (68 - 99)  BP: 103/69 (15 Mar 2023 06:02) (102/63 - 118/71)  BP(mean): --  RR: 19 (15 Mar 2023 06:02) (18 - 19)  SpO2: 98% (15 Mar 2023 06:02) (96% - 100%)    Parameters below as of 15 Mar 2023 06:02  Patient On (Oxygen Delivery Method): room air        Gen: NAD  Abd: soft, NT, ND, fundus firm below umbilicus  Lochia: moderate  Ext: no tenderness    Labs:                        10.6   12.91 )-----------( 139      ( 14 Mar 2023 02:02 )             33.7       A: 32y PPD#  1 s/p  doing well.    Plan:  Routine postpartum care  discharge planning

## 2023-03-16 ENCOUNTER — APPOINTMENT (OUTPATIENT)
Dept: ANTEPARTUM | Facility: CLINIC | Age: 33
End: 2023-03-16

## 2023-03-16 VITALS
TEMPERATURE: 98 F | RESPIRATION RATE: 18 BRPM | HEART RATE: 83 BPM | OXYGEN SATURATION: 100 % | DIASTOLIC BLOOD PRESSURE: 70 MMHG | SYSTOLIC BLOOD PRESSURE: 110 MMHG

## 2023-03-16 RX ADMIN — Medication 975 MILLIGRAM(S): at 09:16

## 2023-03-16 RX ADMIN — Medication 50 MICROGRAM(S): at 05:59

## 2023-03-16 RX ADMIN — Medication 600 MILLIGRAM(S): at 06:55

## 2023-03-16 RX ADMIN — MAGNESIUM HYDROXIDE 30 MILLILITER(S): 400 TABLET, CHEWABLE ORAL at 12:52

## 2023-03-16 RX ADMIN — Medication 975 MILLIGRAM(S): at 02:35

## 2023-03-16 RX ADMIN — Medication 1 TABLET(S): at 12:53

## 2023-03-16 RX ADMIN — Medication 600 MILLIGRAM(S): at 13:54

## 2023-03-16 RX ADMIN — Medication 975 MILLIGRAM(S): at 03:30

## 2023-03-16 RX ADMIN — OXYCODONE HYDROCHLORIDE 5 MILLIGRAM(S): 5 TABLET ORAL at 02:35

## 2023-03-16 RX ADMIN — Medication 600 MILLIGRAM(S): at 00:40

## 2023-03-16 RX ADMIN — Medication 975 MILLIGRAM(S): at 08:16

## 2023-03-16 RX ADMIN — OXYCODONE HYDROCHLORIDE 5 MILLIGRAM(S): 5 TABLET ORAL at 03:30

## 2023-03-16 RX ADMIN — Medication 600 MILLIGRAM(S): at 12:54

## 2023-03-16 RX ADMIN — Medication 600 MILLIGRAM(S): at 06:00

## 2023-03-16 NOTE — CHART NOTE - NSCHARTNOTEFT_GEN_A_CORE
ATT:  S: Patient doing well. Minimal lochia. MInimal pain currently but pt did report diffuse abdominal pain,  Took oxycodone at 5 am and motrin at 8 and tylenolat 11. breastfeeding    O: Vital Signs Last 24 Hrs  T(C): 36.7 (16 Mar 2023 06:19), Max: 36.8 (15 Mar 2023 17:34)  T(F): 98 (16 Mar 2023 06:19), Max: 98.2 (15 Mar 2023 17:34)  HR: 85 (16 Mar 2023 06:19) (85 - 103)  BP: 109/65 (16 Mar 2023 06:19) (109/59 - 109/65)  BP(mean): --  RR: 19 (16 Mar 2023 06:19) (18 - 19)  SpO2: 99% (16 Mar 2023 06:19) (99% - 100%)    Parameters below as of 16 Mar 2023 06:19  Patient On (Oxygen Delivery Method): room air        Gen: NAD  Abd: soft, NT, mildly distended, fundus firm below umbilicus, abdominal binder placed on pt  Ext: no tenderness        A: 32y PPD#2 s/p  doing well, discussed regimen of NSAIDs for home.     Plan: Continue routine postpartum care. D/c home today. Mayte Sauceda MD

## 2023-05-22 ENCOUNTER — APPOINTMENT (OUTPATIENT)
Dept: OBGYN | Facility: CLINIC | Age: 33
End: 2023-05-22
Payer: MEDICAID

## 2023-05-22 VITALS
BODY MASS INDEX: 26.13 KG/M2 | DIASTOLIC BLOOD PRESSURE: 77 MMHG | WEIGHT: 142 LBS | HEART RATE: 81 BPM | SYSTOLIC BLOOD PRESSURE: 114 MMHG | HEIGHT: 62 IN

## 2023-05-22 PROCEDURE — 0503F POSTPARTUM CARE VISIT: CPT

## 2023-05-22 NOTE — HISTORY OF PRESENT ILLNESS
[Postpartum Follow Up] : postpartum follow up [Delivery Date: ___] : on [unfilled] [Male] : Delivery History: baby boy [Vaginal Delivery] : vaginal delivery [Delivery Date Was ___] : delivery date was [unfilled] [Pertussis Vaccine] : Pertussis vaccine administered [Boy] : baby is a boy [Infant's Name ___] : [unfilled] [___ Lbs] : [unfilled] lbs [___ Oz] : [unfilled] oz [Not Circumcised] : not circumcised [Living at Home] : is currently living at home [Bottle Feeding] : bottle feeding [BF with Difficulty] : nursing with difficulty [Resumed Menses] : has resumed her menses [Resumed [place]] : has resumed intercourse [Last Pap Date: ___] : Last Pap Date: [unfilled] [Healed] : healed [Back to Normal] : is back to normal in size [Doing Well] : is doing well [Complications:___] : no complications [] : delivered by vaginal delivery [Rhogam] : Rhogam was not administered [Rubella Vaccine] : Rubella vaccine was not administered [BTL] : no tubal ligation [Breastfeeding] : not currently nursing [Intended Contraception] : the patient does not intended to use contraception postpartum [S/Sx PP Depression] : no signs/symptoms of postpartum depression [Erythema] : not erythematous [Mild] : mild vaginal bleeding [Normal] : the vagina was normal [Cervix Sample Taken] : cervical sample not taken for a Pap smear [Not Done] : Examination of breasts not done [No Sign of Infection] : is showing no signs of infection [Excellent Pain Control] : has excellent pain control [None] : None [de-identified] : Information given about birth control options. Pt will get back to MD with decision. Advised to use condoms in the mean time

## 2023-06-07 NOTE — H&P ADULT - NSHPREVIEWOFSYSTEMS_GEN_ALL_CORE
Gen: Denies fever, weight loss  CV: Denies chest pain, palpitations  HEENT: Denies neck pain, sore throat, eye discharge  Skin: Denies rash, erythema, color changes  Resp: Denies SOB, cough  Endo: Denies sensitivity to heat, cold, increased urination  GI: Denies constipation, nausea, vomiting  Msk: Denies back pain, LE swelling, extremity pain  : Denies dysuria, increased frequency  Neuro: Denies LOC, weakness, seizures  Psych: Denies hx of psych, hallucinations, SI
no

## 2023-08-04 ENCOUNTER — OUTPATIENT (OUTPATIENT)
Dept: OUTPATIENT SERVICES | Facility: HOSPITAL | Age: 33
LOS: 1 days | End: 2023-08-04
Payer: MEDICAID

## 2023-08-04 ENCOUNTER — APPOINTMENT (OUTPATIENT)
Dept: ULTRASOUND IMAGING | Facility: CLINIC | Age: 33
End: 2023-08-04
Payer: MEDICAID

## 2023-08-04 DIAGNOSIS — Z00.8 ENCOUNTER FOR OTHER GENERAL EXAMINATION: ICD-10-CM

## 2023-08-04 PROCEDURE — 76536 US EXAM OF HEAD AND NECK: CPT | Mod: 26

## 2023-08-04 PROCEDURE — 76536 US EXAM OF HEAD AND NECK: CPT

## 2023-10-18 ENCOUNTER — EMERGENCY (EMERGENCY)
Facility: HOSPITAL | Age: 33
LOS: 1 days | Discharge: HOME | End: 2023-10-18
Admitting: EMERGENCY MEDICINE
Payer: MEDICAID

## 2023-10-18 VITALS
OXYGEN SATURATION: 100 % | HEART RATE: 98 BPM | RESPIRATION RATE: 18 BRPM | TEMPERATURE: 98 F | SYSTOLIC BLOOD PRESSURE: 118 MMHG | DIASTOLIC BLOOD PRESSURE: 65 MMHG

## 2023-10-18 PROCEDURE — 99284 EMERGENCY DEPT VISIT MOD MDM: CPT

## 2023-10-18 RX ORDER — IBUPROFEN 200 MG
600 TABLET ORAL ONCE
Refills: 0 | Status: COMPLETED | OUTPATIENT
Start: 2023-10-18 | End: 2023-10-18

## 2023-10-18 RX ORDER — CYCLOBENZAPRINE HYDROCHLORIDE 10 MG/1
5 TABLET, FILM COATED ORAL ONCE
Refills: 0 | Status: COMPLETED | OUTPATIENT
Start: 2023-10-18 | End: 2023-10-18

## 2023-10-18 RX ADMIN — CYCLOBENZAPRINE HYDROCHLORIDE 5 MILLIGRAM(S): 10 TABLET, FILM COATED ORAL at 02:33

## 2023-10-18 RX ADMIN — Medication 600 MILLIGRAM(S): at 02:33

## 2023-10-18 NOTE — ED ADULT NURSE NOTE - IN THE PAST 12 MONTHS HAVE YOU USED DRUGS OTHER THAN THOSE REQUIRED FOR MEDICAL REASON?
the day with oxygen donned   2. sit to stand increasing reps while monitoring oxygen levels with her own pulse oximeter keeping her range above 88%  3. Deep breathing   Written instructions issued. Patient/caregiver verbalized understanding. PATIENT RESPONSE TO TREATMENT: Pt with significant SOB throughout assessment requiring frequent seated rests to complete tasks  CONTINUED NEED FOR THE FOLLOWING SKILLS: HH PT is medically necessary to address pain, decreased strength, increased swelling, impaired bed mobility, decreased independence with functional transfers, impaired gait, impaired stair negotiation, and impaired balance in order to improve functional independence, quality of life, return to PLOF, and reduce the risk for falls.   PLAN: PT 2W4 for energy conservation training, strengthening, balance training, endurance training ad gait training to improve safe and independent mobility in the home and prevent falls  DISCHARGE PLANNING DISCUSSED: Discharge to self and family under MD supervision once all goals have been met or patient has reached max potential. No

## 2023-10-18 NOTE — ED PROVIDER NOTE - CLINICAL SUMMARY MEDICAL DECISION MAKING FREE TEXT BOX
33-year-old female with history of thyroid disease (on levothyroxine) presents to the hospital complaining of body aches including her neck, back, shoulders, right hip following a fall 2 days ago.  Patient states there was water on her floor that caused her to trip and fall and because she was holding the baby she fell awkwardly.  Patient denies head strike or LOC.  Patient notes that she has had progressively worsening body aches and spasms that are unrelieved with Tylenol.  Patient denies nausea, vomiting, dizziness, change in her vision, abdominal pain, vomiting, chest pain, shortness of breath.  No other voiced complaints.  VSS. Exam as noted above. No bony tenderness or limits in ROM therefore imaging will be deferred at this time. Will medicate and reassess. Anticipate d/c home with return precautions. 33-year-old female with history of thyroid disease (on levothyroxine) presents to the hospital complaining of body aches including her neck, back, shoulders, right hip following a fall 2 days ago.  Patient states there was water on her floor that caused her to trip and fall and because she was holding the baby she fell awkwardly.  Patient denies head strike or LOC.  Patient notes that she has had progressively worsening body aches and spasms that are unrelieved with Tylenol.  Patient denies nausea, vomiting, dizziness, change in her vision, abdominal pain, vomiting, chest pain, shortness of breath.  No other voiced complaints.  VSS. Exam as noted above. No echymosis, bony tenderness or limits in ROM therefore imaging will be deferred at this time. Will medicate and reassess. Anticipate d/c home with return precautions.

## 2023-10-18 NOTE — ED PROVIDER NOTE - PHYSICAL EXAMINATION
NEURO:  Awake, alert and cooperative. Gait steady without assistance. No focal weakness or paralysis.  HEAD:  Atraumatic. Negative Coley's sign.  PSYCH: Mood appropriate to subject. Thought processes intact.   NECK:  Non-tender to bony palpation. Bilateral paraspinous muscle tenderness. FROM.   CARD: Regular rate and rhythm.  CHEST/RESP:  No chest wall contusion. No accessory muscle use; breath sounds clear and equal bilaterally. No pain with chest palpation.  ABD:  No flank ecchymosis. Soft; non-distended; non-tender. No guarding or rebound.   MUSCULOSKELETAL/EXTREMITIES: FROM in all four extremities; non-tender to palpation; radial pulses are 2+; no extremity edema.   BACK: No bony point-tenderness. ROM intact.   SKIN:  Warm; dry; no apparent lesions or exudate. No rashes, no abrasions or contusions.  Well hydrated. NEURO:  Awake, alert and cooperative. Gait steady without assistance. No focal weakness or paralysis.  HEAD:  Atraumatic. Negative Coley's sign.  PSYCH: Mood appropriate to subject. Thought processes intact.   NECK:  Non-tender to bony palpation. Bilateral paraspinous muscle tenderness. FROM.   CARD: Regular rate and rhythm.  CHEST/RESP:  No chest wall contusion. No accessory muscle use; breath sounds clear and equal bilaterally. No pain with chest palpation.  ABD:  No flank ecchymosis. Soft; non-distended; non-tender. No guarding or rebound.   MUSCULOSKELETAL/EXTREMITIES: FROM in all four extremities; non-tender to palpation; radial pulses are 2+; no extremity edema. Right hip without ecchymosis or abrasion; tenderness to proximal hamstring with palpation.   BACK: No bony point-tenderness. Diffuse b/l paraspinous muscle tenderness. ROM intact.   SKIN:  Warm; dry; no apparent lesions or exudate. No rashes, no abrasions or contusions.  Well hydrated.

## 2023-10-18 NOTE — ED PROVIDER NOTE - OBJECTIVE STATEMENT
33-year-old female with history of thyroid disease (on levothyroxine) presents to the hospital complaining of body aches including her neck, back, shoulders, right hip following a fall 2 days ago.  Patient states there was water on her floor that caused her to trip and fall and because she was holding the baby she fell awkwardly.  Patient denies head strike or LOC.  Patient notes that she has had progressively worsening body aches and spasms that are unrelieved with Tylenol.  Patient denies nausea, vomiting, dizziness, change in her vision, abdominal pain, vomiting, chest pain, shortness of breath.  No other voiced complaints. 33-year-old female with history of thyroid disease (on levothyroxine) presents to the hospital complaining of body aches including her neck, back, shoulders, right hip following a fall 2 days ago.  Patient states there was water on her floor that caused her to trip and fall and because she was holding a baby she fell awkwardly.  Patient denies head strike or LOC.  Patient notes that she has had progressively worsening body aches and spasms that are unrelieved with Tylenol.  Patient denies nausea, vomiting, dizziness, change in her vision, abdominal pain, vomiting, chest pain, shortness of breath.  No other voiced complaints.

## 2023-10-18 NOTE — ED ADULT NURSE NOTE - NSFALLRISKINTERV_ED_ALL_ED

## 2023-10-18 NOTE — ED ADULT TRIAGE NOTE - CHIEF COMPLAINT QUOTE
Pt c/o right sided neck and shoulder pain s/p slip and fall yesterday. Denies LOC, head trauma or AC use. Denies syncope. No hx

## 2023-10-18 NOTE — ED PROVIDER NOTE - PATIENT PORTAL LINK FT
You can access the FollowMyHealth Patient Portal offered by [facility] by registering at the following website: [email or web address]. By joining [employer]’s FollowMyHealth portal, you will also be able to view your health information using other applications (apps) compatible with our system.

## 2023-10-18 NOTE — ED PROVIDER NOTE - PROGRESS NOTE DETAILS
ELO Royal: Pt reassessed, feeling well, discussed return precautions. All questions answered, pt verbalized understanding.

## 2024-01-29 NOTE — ED PROVIDER NOTE - CCCP TRG CHIEF CMPLNT
fever Communicate risk of Fall with Harm to all staff, patient, and family/Provide visual cue: red socks, yellow wristband, yellow gown, etc/Reinforce activity limits and safety measures with patient and family/Bed in lowest position, wheels locked, appropriate side rails in place/Call bell, personal items and telephone in reach/Instruct patient to call for assistance before getting out of bed/chair/stretcher/Non-slip footwear applied when patient is off stretcher/[place] to call system/Physically safe environment - no spills, clutter or unnecessary equipment/Purposeful Proactive Rounding/Room/bathroom lighting operational, light cord in reach

## 2024-04-02 NOTE — OB RN TRIAGE NOTE - NSDCRESPRATE_OBGYN_A_OB_NU
"Subjective:       Patient ID: Juanpablo Guthrie is a 58 y.o. male.    Vitals:  height is 5' 10" (1.778 m) and weight is 79.4 kg (175 lb). His oral temperature is 97.6 °F (36.4 °C). His blood pressure is 132/80 and his pulse is 80. His respiration is 18 and oxygen saturation is 97%.     Chief Complaint: Leg Swelling    This is a 58 y.o. male who presents today with a chief complaint of moderate swelling and redness to right lower leg that started approximately 3 months ago but has significantly worsened over the past week. Pt does report that he has experienced mild swelling on and off to left lower leg also over the past couple months but main concern is swelling to right leg. Denies any SOB, chest pain or cardiac history.       Patient presents with:  Leg Swelling        Edema  This is a chronic problem. The current episode started more than 1 month ago. The problem occurs constantly. The problem has been gradually worsening. Pertinent negatives include no chest pain. Associated symptoms comments: SOB. The symptoms are aggravated by standing. He has tried nothing for the symptoms.       Constitution: Negative.   Cardiovascular:  Positive for leg swelling. Negative for chest pain, palpitations and sob on exertion.   Skin:  Positive for erythema.   Neurological:  Negative for disorientation and altered mental status.   Psychiatric/Behavioral:  Negative for altered mental status, disorientation and confusion.            Objective:      Physical Exam   Constitutional: He is oriented to person, place, and time. He appears well-developed. He is cooperative.   HENT:   Head: Normocephalic and atraumatic.   Ears:   Right Ear: Hearing, tympanic membrane, external ear and ear canal normal.   Left Ear: Hearing, tympanic membrane, external ear and ear canal normal.   Nose: Nose normal. No mucosal edema or nasal deformity. No epistaxis. Right sinus exhibits no maxillary sinus tenderness and no frontal sinus tenderness. Left sinus " exhibits no maxillary sinus tenderness and no frontal sinus tenderness.   Mouth/Throat: Uvula is midline, oropharynx is clear and moist and mucous membranes are normal. No trismus in the jaw. Normal dentition. No uvula swelling.   Eyes: Conjunctivae and lids are normal.   Neck: Trachea normal and phonation normal. Neck supple.   Cardiovascular: Normal rate, regular rhythm, normal heart sounds and normal pulses.   Pulmonary/Chest: Effort normal and breath sounds normal.   Abdominal: Normal appearance and bowel sounds are normal. Soft.   Musculoskeletal: Normal range of motion.         General: Normal range of motion.      Right lower leg: He exhibits swelling. He exhibits no tenderness. Edema (noted from foot to proximaly tib/fib area, moderaet eryteham and warmth to this area. lower leg is firm to palpation. no open wounds.) present.      Left lower leg: He exhibits no swelling. No edema.   Neurological: He is alert and oriented to person, place, and time. He exhibits normal muscle tone.   Skin: Skin is warm, dry and intact. erythema   Psychiatric: His speech is normal and behavior is normal. Judgment and thought content normal.   Nursing note and vitals reviewed.        Past medical history and current medications reviewed.       Assessment:           1. Edema of right lower leg              Plan:         Edema of right lower leg           Patient Instructions   Report to ER for further work up and evaluation given acute worsening of redness and edema to RLE over the past week.          Scott Pandya, RAFAELA-C     Medical Decision Making:   Urgent Care Management:  Recommend ER for evaluation. Pt verbalizes understanding and agrees with plan of care.                   16

## 2024-10-03 NOTE — DISCHARGE NOTE OB - PATIENT PORTAL LINK FT
[Alert] : alert [Well Nourished] : well nourished [No Acute Distress] : no acute distress [Well Developed] : well developed [Normal Sclera/Conjunctiva] : normal sclera/conjunctiva [EOMI] : extra ocular movement intact [No Proptosis] : no proptosis [Normal Oropharynx] : the oropharynx was normal [No Respiratory Distress] : no respiratory distress [No Accessory Muscle Use] : no accessory muscle use [Clear to Auscultation] : lungs were clear to auscultation bilaterally [Normal S1, S2] : normal S1 and S2 [Normal Rate] : heart rate was normal [Regular Rhythm] : with a regular rhythm [No Edema] : no peripheral edema [Pedal Pulses Normal] : the pedal pulses are present [Normal Bowel Sounds] : normal bowel sounds [Not Tender] : non-tender [Not Distended] : not distended [Soft] : abdomen soft [Normal Anterior Cervical Nodes] : no anterior cervical lymphadenopathy [No Spinal Tenderness] : no spinal tenderness [Spine Straight] : spine straight [No Stigmata of Cushings Syndrome] : no stigmata of Cushings Syndrome [Normal Gait] : normal gait [Normal Strength/Tone] : muscle strength and tone were normal [No Rash] : no rash [Normal Reflexes] : deep tendon reflexes were 2+ and symmetric [No Tremors] : no tremors [Oriented x3] : oriented to person, place, and time [Acanthosis Nigricans] : no acanthosis nigricans [de-identified] : heterogeneous thyroid  “You can access the FollowHealth Patient Portal, offered by [facility], by registering with the following website: [email or web address]”
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
## 2024-12-28 ENCOUNTER — EMERGENCY (EMERGENCY)
Facility: HOSPITAL | Age: 34
LOS: 1 days | Discharge: HOME | End: 2024-12-28
Admitting: EMERGENCY MEDICINE
Payer: MEDICAID

## 2024-12-28 VITALS
TEMPERATURE: 98 F | HEART RATE: 67 BPM | WEIGHT: 136.69 LBS | SYSTOLIC BLOOD PRESSURE: 114 MMHG | DIASTOLIC BLOOD PRESSURE: 65 MMHG | RESPIRATION RATE: 18 BRPM | OXYGEN SATURATION: 99 %

## 2024-12-28 LAB
ALBUMIN SERPL ELPH-MCNC: 4.2 G/DL — SIGNIFICANT CHANGE UP (ref 3.3–5)
ALP SERPL-CCNC: 105 U/L — SIGNIFICANT CHANGE UP (ref 40–120)
ALT FLD-CCNC: 11 U/L — SIGNIFICANT CHANGE UP (ref 4–33)
ANION GAP SERPL CALC-SCNC: 10 MMOL/L — SIGNIFICANT CHANGE UP (ref 7–14)
AST SERPL-CCNC: 12 U/L — SIGNIFICANT CHANGE UP (ref 4–32)
BASOPHILS # BLD AUTO: 0.02 K/UL — SIGNIFICANT CHANGE UP (ref 0–0.2)
BASOPHILS NFR BLD AUTO: 0.3 % — SIGNIFICANT CHANGE UP (ref 0–2)
BILIRUB SERPL-MCNC: 0.8 MG/DL — SIGNIFICANT CHANGE UP (ref 0.2–1.2)
BUN SERPL-MCNC: 8 MG/DL — SIGNIFICANT CHANGE UP (ref 7–23)
CALCIUM SERPL-MCNC: 9.4 MG/DL — SIGNIFICANT CHANGE UP (ref 8.4–10.5)
CHLORIDE SERPL-SCNC: 106 MMOL/L — SIGNIFICANT CHANGE UP (ref 98–107)
CO2 SERPL-SCNC: 25 MMOL/L — SIGNIFICANT CHANGE UP (ref 22–31)
CREAT SERPL-MCNC: 0.65 MG/DL — SIGNIFICANT CHANGE UP (ref 0.5–1.3)
EGFR: 118 ML/MIN/1.73M2 — SIGNIFICANT CHANGE UP
EOSINOPHIL # BLD AUTO: 0.21 K/UL — SIGNIFICANT CHANGE UP (ref 0–0.5)
EOSINOPHIL NFR BLD AUTO: 2.8 % — SIGNIFICANT CHANGE UP (ref 0–6)
GLUCOSE SERPL-MCNC: 92 MG/DL — SIGNIFICANT CHANGE UP (ref 70–99)
HCT VFR BLD CALC: 36.5 % — SIGNIFICANT CHANGE UP (ref 34.5–45)
HGB BLD-MCNC: 11.5 G/DL — SIGNIFICANT CHANGE UP (ref 11.5–15.5)
IANC: 5.15 K/UL — SIGNIFICANT CHANGE UP (ref 1.8–7.4)
IMM GRANULOCYTES NFR BLD AUTO: 0.3 % — SIGNIFICANT CHANGE UP (ref 0–0.9)
LYMPHOCYTES # BLD AUTO: 1.58 K/UL — SIGNIFICANT CHANGE UP (ref 1–3.3)
LYMPHOCYTES # BLD AUTO: 21.3 % — SIGNIFICANT CHANGE UP (ref 13–44)
MCHC RBC-ENTMCNC: 26.5 PG — LOW (ref 27–34)
MCHC RBC-ENTMCNC: 31.5 G/DL — LOW (ref 32–36)
MCV RBC AUTO: 84.1 FL — SIGNIFICANT CHANGE UP (ref 80–100)
MONOCYTES # BLD AUTO: 0.45 K/UL — SIGNIFICANT CHANGE UP (ref 0–0.9)
MONOCYTES NFR BLD AUTO: 6.1 % — SIGNIFICANT CHANGE UP (ref 2–14)
NEUTROPHILS # BLD AUTO: 5.15 K/UL — SIGNIFICANT CHANGE UP (ref 1.8–7.4)
NEUTROPHILS NFR BLD AUTO: 69.2 % — SIGNIFICANT CHANGE UP (ref 43–77)
NRBC # BLD: 0 /100 WBCS — SIGNIFICANT CHANGE UP (ref 0–0)
NRBC # FLD: 0 K/UL — SIGNIFICANT CHANGE UP (ref 0–0)
PLATELET # BLD AUTO: 163 K/UL — SIGNIFICANT CHANGE UP (ref 150–400)
POTASSIUM SERPL-MCNC: 3.9 MMOL/L — SIGNIFICANT CHANGE UP (ref 3.5–5.3)
POTASSIUM SERPL-SCNC: 3.9 MMOL/L — SIGNIFICANT CHANGE UP (ref 3.5–5.3)
PROT SERPL-MCNC: 7.6 G/DL — SIGNIFICANT CHANGE UP (ref 6–8.3)
RBC # BLD: 4.34 M/UL — SIGNIFICANT CHANGE UP (ref 3.8–5.2)
RBC # FLD: 14.6 % — HIGH (ref 10.3–14.5)
SODIUM SERPL-SCNC: 141 MMOL/L — SIGNIFICANT CHANGE UP (ref 135–145)
WBC # BLD: 7.43 K/UL — SIGNIFICANT CHANGE UP (ref 3.8–10.5)
WBC # FLD AUTO: 7.43 K/UL — SIGNIFICANT CHANGE UP (ref 3.8–10.5)

## 2024-12-28 PROCEDURE — 72125 CT NECK SPINE W/O DYE: CPT | Mod: 26,MC

## 2024-12-28 PROCEDURE — 99284 EMERGENCY DEPT VISIT MOD MDM: CPT

## 2024-12-28 RX ORDER — DIAZEPAM 10 MG/1
5 TABLET ORAL ONCE
Refills: 0 | Status: DISCONTINUED | OUTPATIENT
Start: 2024-12-28 | End: 2024-12-28

## 2024-12-28 RX ORDER — DIAZEPAM 10 MG/1
1 TABLET ORAL
Qty: 9 | Refills: 0
Start: 2024-12-28 | End: 2024-12-30

## 2024-12-28 RX ORDER — ONDANSETRON HYDROCHLORIDE 4 MG/1
4 TABLET, FILM COATED ORAL ONCE
Refills: 0 | Status: COMPLETED | OUTPATIENT
Start: 2024-12-28 | End: 2024-12-28

## 2024-12-28 RX ORDER — LIDOCAINE 40 MG/G
1 CREAM TOPICAL ONCE
Refills: 0 | Status: COMPLETED | OUTPATIENT
Start: 2024-12-28 | End: 2024-12-28

## 2024-12-28 RX ORDER — SODIUM CHLORIDE 9 MG/ML
1000 INJECTION, SOLUTION INTRAMUSCULAR; INTRAVENOUS; SUBCUTANEOUS ONCE
Refills: 0 | Status: COMPLETED | OUTPATIENT
Start: 2024-12-28 | End: 2024-12-28

## 2024-12-28 RX ORDER — IBUPROFEN 200 MG
1 TABLET ORAL
Qty: 21 | Refills: 0
Start: 2024-12-28 | End: 2025-01-03

## 2024-12-28 RX ADMIN — ONDANSETRON HYDROCHLORIDE 4 MILLIGRAM(S): 4 TABLET, FILM COATED ORAL at 18:08

## 2024-12-28 RX ADMIN — SODIUM CHLORIDE 1000 MILLILITER(S): 9 INJECTION, SOLUTION INTRAMUSCULAR; INTRAVENOUS; SUBCUTANEOUS at 18:27

## 2024-12-28 RX ADMIN — LIDOCAINE 1 PATCH: 40 CREAM TOPICAL at 16:22

## 2024-12-28 RX ADMIN — DIAZEPAM 5 MILLIGRAM(S): 10 TABLET ORAL at 16:23

## 2024-12-28 NOTE — ED PROVIDER NOTE - PHYSICAL EXAMINATION
Vital signs reviewed.   CONSTITUTIONAL: Well-appearing; well-nourished; in no apparent distress. Non-toxic appearing.   HEAD: Normocephalic, atraumatic.  EYES: PERRL, EOM intact, conjunctiva and sclera WNL.  ENT: normal nose; no rhinorrhea; normal pharynx with no tonsillar hypertrophy, no erythema, no exudate, no lymphadenopathy.  NECK/LYMPH: Supple; no cervical lymphadenopathy.   CARD: Normal S1, S2; no murmurs, rubs, or gallops noted.  RESP: Normal chest excursion with respiration; breath sounds clear and equal bilaterally; no wheezes, rhonchi, or rales.  ABD/GI: soft, non-distended; non-tender; no palpable organomegaly, no pulsatile mass.  EXT/MS: moves all extremities; distal pulses are normal, no pedal edema. + b/l paraspinal ttp R>L cervical spine with LROM of neck side to side but able to chin to chest. No midline spinal ttp.   SKIN: Normal for age and race; warm; dry; good turgor; no apparent lesions or exudate noted.  NEURO: Awake, alert, oriented x 3, no gross deficits, CN II-XII grossly intact, no motor or sensory deficit noted.  PSYCH: Normal mood; appropriate affect.

## 2024-12-28 NOTE — ED PROVIDER NOTE - PATIENT PORTAL LINK FT
You can access the FollowMyHealth Patient Portal offered by [facility] by registering at the following website: [email or web address]. By joining [employer]’s FollowMyHealth portal, you will also be able to view your health information using other applications (apps) compatible with our system.

## 2024-12-28 NOTE — ED PROVIDER NOTE - CLINICAL SUMMARY MEDICAL DECISION MAKING FREE TEXT BOX
34-year-old female with past medical history of hypothyroid presents to the ER complaining of neck pain that began earlier today took Motrin and meloxicam without relief, patient with difficulty moving neck this is the first time this has happened to her before.  Patient reports has a 1-1/2-year-old so does lift up child often during the day.   Exam consistent with torticollis worse on the right side no fever no recent infectious symptoms neurovascularly intact.    CT C-spine Provide pain medication and reassess

## 2024-12-28 NOTE — ED PROVIDER NOTE - OBJECTIVE STATEMENT
34-year-old female with past medical history of hypothyroid presents to the ER complaining of neck pain that began earlier today took Motrin and meloxicam without relief, patient with difficulty moving neck this is the first time this has happened to her before.  Patient reports has a 1-1/2-year-old so does lift up child often during the day.  Denies any other known injuries, trauma.  Denies numbness, tingling, fevers, chills, nausea, vomiting, visual changes, headache,, chest pain shortness of breath. NKDA

## 2024-12-28 NOTE — ED PROVIDER NOTE - NSFOLLOWUPINSTRUCTIONS_ED_ALL_ED_FT
Acute Torticollis, Adult  Torticollis is a condition in which the muscles of the neck tighten (contract) abnormally, causing the neck to twist and the head to move into an unnatural position. Torticollis that develops suddenly is called acute torticollis. People with acute torticollis may have trouble turning their head. The condition can be painful and may range from mild to severe.    What are the causes?  This condition may be caused by:  Sleeping in an awkward position. This is common.  Extending or twisting the neck muscles beyond their normal position.  An injury to the neck muscles.  An infection.  A tumor.  Certain medicines.  Long-lasting spasms of the neck muscles.  In some cases, the cause may not be known.    What increases the risk?  You are more likely to develop this condition if:  You have a condition associated with loose ligaments, such as Down syndrome.  You have a brain condition that affects vision, such as strabismus.  What are the signs or symptoms?  The main symptom of this condition is tilting of the head to one side. Other symptoms include:  Pain in the neck.  Trouble turning the head from side to side or up and down.  How is this diagnosed?  This condition may be diagnosed based on:  A physical exam.  Your medical history.  Imaging tests, such as:  An X-ray.  An ultrasound.  A CT scan.  An MRI.  How is this treated?  Treatment for this condition depends on what is causing the condition. Mild cases may go away without treatment. Treatment for more serious cases may include:  Medicines or shots to relax the muscles.  Other medicines, such as antibiotics, to treat the underlying cause.  Wearing a soft neck collar.  Physical therapy and stretching exercises to improve movement and strength in your neck.  Neck massage.  In severe cases, surgery may be needed to repair dislocated or broken bones or to treat nerves in the neck.    Follow these instructions at home:    Take over-the-counter and prescription medicines only as told by your health care provider.  Do stretching exercises and massage your neck as told by your health care provider.  If directed, apply heat to the affected area as often as told by your health care provider. Use the heat source that your health care provider recommends, such as a moist heat pack or a heating pad.  Place a towel between your skin and the heat source.  Leave the heat on for 20–30 minutes.  Remove the heat if your skin turns bright red. This is especially important if you are unable to feel pain, heat, or cold. You have a greater risk of getting burned.  If you wake up with torticollis after sleeping, check your bed or sleeping area. Look for lumpy pillows or unusual objects. Make sure your bed and sleeping area are comfortable.  Keep all follow-up visits. This is important.  Contact a health care provider if:  You have a fever.  Your symptoms do not improve or they get worse.  Get help right away if:  You have trouble breathing.  You make loud, high-pitched sounds when you breathe, most often when you breathe in (stridor).  You start to drool.  You have trouble swallowing or pain when swallowing.  You develop numbness or weakness in your hands or feet.  You have changes in your speech, understanding, or vision.  You are in severe pain.  You cannot move your head or neck.  These symptoms may represent a serious problem that is an emergency. Do not wait to see if the symptoms will go away. Get medical help right away. Call your local emergency services (911 in the [place] Do not drive yourself to the hospital.    Summary  Torticollis is a condition in which the muscles of the neck tighten (contract) abnormally, causing the neck to twist and the head to move into an unnatural position. Torticollis that develops suddenly is called acute torticollis.  Treatment for this condition depends on what is causing the condition. Mild cases may go away without treatment.  Do stretching exercises and massage your neck as told by your health care provider. You may also be instructed to apply heat to the area.  Contact your health care provider if your symptoms do not improve or they get worse.  This information is not intended to replace advice given to you by your health care provider. Make sure you discuss any questions you have with your health care provider.    Document Revised: 04/16/2021 Document Reviewed: 04/16/2021 Follow with your PMD within 48-72 hours.  Rest, no heavy lifting.  Warm compresses to area. Take Motrin 600 mg every 8 hours for pain with food, Valium 5mg every 8 hours as needed for muscle spasm- caution drowsiness/do not drive. Any worsening pain, weakness, numbness, return to ER               Acute Torticollis, Adult  Torticollis is a condition in which the muscles of the neck tighten (contract) abnormally, causing the neck to twist and the head to move into an unnatural position. Torticollis that develops suddenly is called acute torticollis. People with acute torticollis may have trouble turning their head. The condition can be painful and may range from mild to severe.    What are the causes?  This condition may be caused by:  Sleeping in an awkward position. This is common.  Extending or twisting the neck muscles beyond their normal position.  An injury to the neck muscles.  An infection.  A tumor.  Certain medicines.  Long-lasting spasms of the neck muscles.  In some cases, the cause may not be known.    What increases the risk?  You are more likely to develop this condition if:  You have a condition associated with loose ligaments, such as Down syndrome.  You have a brain condition that affects vision, such as strabismus.  What are the signs or symptoms?  The main symptom of this condition is tilting of the head to one side. Other symptoms include:  Pain in the neck.  Trouble turning the head from side to side or up and down.  How is this diagnosed?  This condition may be diagnosed based on:  A physical exam.  Your medical history.  Imaging tests, such as:  An X-ray.  An ultrasound.  A CT scan.  An MRI.  How is this treated?  Treatment for this condition depends on what is causing the condition. Mild cases may go away without treatment. Treatment for more serious cases may include:  Medicines or shots to relax the muscles.  Other medicines, such as antibiotics, to treat the underlying cause.  Wearing a soft neck collar.  Physical therapy and stretching exercises to improve movement and strength in your neck.  Neck massage.  In severe cases, surgery may be needed to repair dislocated or broken bones or to treat nerves in the neck.    Follow these instructions at home:    Take over-the-counter and prescription medicines only as told by your health care provider.  Do stretching exercises and massage your neck as told by your health care provider.  If directed, apply heat to the affected area as often as told by your health care provider. Use the heat source that your health care provider recommends, such as a moist heat pack or a heating pad.  Place a towel between your skin and the heat source.  Leave the heat on for 20–30 minutes.  Remove the heat if your skin turns bright red. This is especially important if you are unable to feel pain, heat, or cold. You have a greater risk of getting burned.  If you wake up with torticollis after sleeping, check your bed or sleeping area. Look for lumpy pillows or unusual objects. Make sure your bed and sleeping area are comfortable.  Keep all follow-up visits. This is important.  Contact a health care provider if:  You have a fever.  Your symptoms do not improve or they get worse.  Get help right away if:  You have trouble breathing.  You make loud, high-pitched sounds when you breathe, most often when you breathe in (stridor).  You start to drool.  You have trouble swallowing or pain when swallowing.  You develop numbness or weakness in your hands or feet.  You have changes in your speech, understanding, or vision.  You are in severe pain.  You cannot move your head or neck.  These symptoms may represent a serious problem that is an emergency. Do not wait to see if the symptoms will go away. Get medical help right away. Call your local emergency services (911 in the [place] Do not drive yourself to the hospital.    Summary  Torticollis is a condition in which the muscles of the neck tighten (contract) abnormally, causing the neck to twist and the head to move into an unnatural position. Torticollis that develops suddenly is called acute torticollis.  Treatment for this condition depends on what is causing the condition. Mild cases may go away without treatment.  Do stretching exercises and massage your neck as told by your health care provider. You may also be instructed to apply heat to the area.  Contact your health care provider if your symptoms do not improve or they get worse.  This information is not intended to replace advice given to you by your health care provider. Make sure you discuss any questions you have with your health care provider.    Document Revised: 04/16/2021 Document Reviewed: 04/16/2021

## 2024-12-28 NOTE — ED PROVIDER NOTE - PROGRESS NOTE DETAILS
Patient had episode of nausea and vomiting secondary to dizziness likely from pain medication.  Patient feels much improved after fluids labs within normal limits CT scan showing normal bony structures showing a reversal of the cervical lordosis likely due to muscle spasm seen on physical exam.  Patient has torticollis.  Patient feeling improved.  Will DC home with muscle relaxer

## 2024-12-31 NOTE — ED PROVIDER NOTE - MUSCULOSKELETAL, MLM
Per provider, \"It is noted that the patient was prescribed Ciprofloxacin for a presumed Urinary Tract Infection.   Based on the Urine Culture results, the antibiotic that was prescribed will be susceptible to the bacteria found, and no change in treatment is indicated.   Please complete the entire antibiotic.      If you continue with symptoms or if your symptoms return, please seek in person evaluation.       RN to notify patient.\"    Patient notified of above information, verbalizes understanding. Denies any additional questions or concerns.      Spine appears normal, range of motion is not limited, no muscle or joint tenderness

## 2025-02-11 NOTE — DISCHARGE NOTE OB - NS OB DC MMR REASON NOT RECEIVED
[facility] Call Center    Phone Message    May a detailed message be left on voicemail: yes    Reason for Call: [facility] called stating they sent over a cardiac clearance form and have not yet received anything back. Please call back to address.    Thank you!  [facility]          
Returned call. No request received via fax. Called number which goes to scheduling and they can't help and don't know where to transfer. Called back again and again no one was able to find who called and was waiting for this clearance. TARIK Lopez  
Contraindicated